# Patient Record
Sex: FEMALE | Race: WHITE | URBAN - METROPOLITAN AREA
[De-identification: names, ages, dates, MRNs, and addresses within clinical notes are randomized per-mention and may not be internally consistent; named-entity substitution may affect disease eponyms.]

---

## 2018-04-12 ENCOUNTER — OFFICE VISIT (OUTPATIENT)
Dept: ORTHOPEDIC SURGERY | Age: 48
End: 2018-04-12

## 2018-04-12 ENCOUNTER — HOSPITAL ENCOUNTER (OUTPATIENT)
Dept: OTHER | Age: 48
Discharge: HOME | End: 2018-04-12
Attending: ORTHOPAEDIC SURGERY | Admitting: ORTHOPAEDIC SURGERY

## 2018-04-12 VITALS
SYSTOLIC BLOOD PRESSURE: 141 MMHG | HEART RATE: 86 BPM | WEIGHT: 138.01 LBS | HEIGHT: 62 IN | DIASTOLIC BLOOD PRESSURE: 87 MMHG | BODY MASS INDEX: 25.4 KG/M2

## 2018-04-12 DIAGNOSIS — M25.532 LEFT WRIST PAIN: Primary | ICD-10-CM

## 2018-04-12 DIAGNOSIS — S63.502A SPRAIN OF LEFT WRIST, INITIAL ENCOUNTER: ICD-10-CM

## 2018-04-12 DIAGNOSIS — M25.532 LEFT WRIST PAIN: ICD-10-CM

## 2018-04-12 LAB
A/G RATIO: 1.2 (ref 1.1–2.2)
ALBUMIN SERPL-MCNC: 4.2 G/DL (ref 3.4–5)
ALP BLD-CCNC: 110 U/L (ref 40–129)
ALT SERPL-CCNC: 14 U/L (ref 10–40)
ANION GAP SERPL CALCULATED.3IONS-SCNC: 19 MMOL/L (ref 3–16)
AST SERPL-CCNC: 15 U/L (ref 15–37)
BASOPHILS ABSOLUTE: 0 K/UL (ref 0–0.2)
BASOPHILS RELATIVE PERCENT: 0.3 %
BILIRUB SERPL-MCNC: <0.2 MG/DL (ref 0–1)
BUN BLDV-MCNC: 8 MG/DL (ref 7–20)
C-REACTIVE PROTEIN: 17.6 MG/L (ref 0–5.1)
CALCIUM SERPL-MCNC: 8.6 MG/DL (ref 8.3–10.6)
CHLORIDE BLD-SCNC: 102 MMOL/L (ref 99–110)
CO2: 20 MMOL/L (ref 21–32)
CREAT SERPL-MCNC: 0.6 MG/DL (ref 0.6–1.1)
EOSINOPHILS ABSOLUTE: 0 K/UL (ref 0–0.6)
EOSINOPHILS RELATIVE PERCENT: 0.7 %
GFR AFRICAN AMERICAN: >60
GFR NON-AFRICAN AMERICAN: >60
GLOBULIN: 3.4 G/DL
GLUCOSE BLD-MCNC: 72 MG/DL (ref 70–99)
HCT VFR BLD CALC: 38 % (ref 36–48)
HEMOGLOBIN: 12.8 G/DL (ref 12–16)
LYMPHOCYTES ABSOLUTE: 1.1 K/UL (ref 1–5.1)
LYMPHOCYTES RELATIVE PERCENT: 18.6 %
MCH RBC QN AUTO: 32.1 PG (ref 26–34)
MCHC RBC AUTO-ENTMCNC: 33.7 G/DL (ref 31–36)
MCV RBC AUTO: 95.2 FL (ref 80–100)
MONOCYTES ABSOLUTE: 0.4 K/UL (ref 0–1.3)
MONOCYTES RELATIVE PERCENT: 6.3 %
NEUTROPHILS ABSOLUTE: 4.5 K/UL (ref 1.7–7.7)
NEUTROPHILS RELATIVE PERCENT: 74.1 %
PDW BLD-RTO: 13.3 % (ref 12.4–15.4)
PLATELET # BLD: 233 K/UL (ref 135–450)
PMV BLD AUTO: 9.6 FL (ref 5–10.5)
POTASSIUM SERPL-SCNC: 3.8 MMOL/L (ref 3.5–5.1)
RBC # BLD: 3.99 M/UL (ref 4–5.2)
RHEUMATOID FACTOR: 42 IU/ML
SEDIMENTATION RATE, ERYTHROCYTE: 26 MM/HR (ref 0–20)
SODIUM BLD-SCNC: 141 MMOL/L (ref 136–145)
TOTAL PROTEIN: 7.6 G/DL (ref 6.4–8.2)
URIC ACID, SERUM: 5.9 MG/DL (ref 2.6–6)
WBC # BLD: 6 K/UL (ref 4–11)

## 2018-04-12 PROCEDURE — 99203 OFFICE O/P NEW LOW 30 MIN: CPT | Performed by: ORTHOPAEDIC SURGERY

## 2018-04-12 PROCEDURE — L3908 WHO COCK-UP NONMOLDE PRE OTS: HCPCS | Performed by: ORTHOPAEDIC SURGERY

## 2018-04-13 LAB
ANA INTERPRETATION: NORMAL
ANTI-NUCLEAR ANTIBODY (ANA): NEGATIVE

## 2018-04-14 LAB — CCP IGG ANTIBODIES: 102 UNITS (ref 0–19)

## 2018-04-26 ENCOUNTER — OFFICE VISIT (OUTPATIENT)
Dept: ORTHOPEDIC SURGERY | Age: 48
End: 2018-04-26

## 2018-04-26 VITALS — BODY MASS INDEX: 25.4 KG/M2 | HEIGHT: 62 IN | WEIGHT: 138.01 LBS

## 2018-04-26 DIAGNOSIS — M79.642 PAIN OF LEFT HAND: ICD-10-CM

## 2018-04-26 DIAGNOSIS — S63.502D SPRAIN OF LEFT WRIST, SUBSEQUENT ENCOUNTER: Primary | ICD-10-CM

## 2018-04-26 PROCEDURE — 99214 OFFICE O/P EST MOD 30 MIN: CPT | Performed by: ORTHOPAEDIC SURGERY

## 2018-04-26 RX ORDER — METHYLPREDNISOLONE 4 MG/1
TABLET ORAL
Qty: 1 KIT | Refills: 0 | Status: SHIPPED | OUTPATIENT
Start: 2018-04-26 | End: 2018-06-28

## 2018-04-27 ENCOUNTER — OFFICE VISIT (OUTPATIENT)
Dept: RHEUMATOLOGY | Age: 48
End: 2018-04-27

## 2018-04-27 VITALS
DIASTOLIC BLOOD PRESSURE: 86 MMHG | HEART RATE: 84 BPM | BODY MASS INDEX: 25.4 KG/M2 | TEMPERATURE: 98 F | SYSTOLIC BLOOD PRESSURE: 138 MMHG | HEIGHT: 62 IN | WEIGHT: 138 LBS

## 2018-04-27 DIAGNOSIS — Z79.899 HIGH RISK MEDICATION USE: ICD-10-CM

## 2018-04-27 DIAGNOSIS — M05.79 RHEUMATOID ARTHRITIS INVOLVING MULTIPLE SITES WITH POSITIVE RHEUMATOID FACTOR (HCC): Primary | ICD-10-CM

## 2018-04-27 PROCEDURE — 99245 OFF/OP CONSLTJ NEW/EST HI 55: CPT | Performed by: INTERNAL MEDICINE

## 2018-04-27 RX ORDER — PREDNISONE 1 MG/1
TABLET ORAL
Qty: 30 TABLET | Refills: 0 | Status: SHIPPED | OUTPATIENT
Start: 2018-04-27 | End: 2020-01-15 | Stop reason: CLARIF

## 2018-04-27 RX ORDER — FOLIC ACID 1 MG/1
1 TABLET ORAL DAILY
Qty: 90 TABLET | Refills: 1 | Status: SHIPPED | OUTPATIENT
Start: 2018-04-27 | End: 2021-05-24

## 2018-04-28 LAB
HBV SURFACE AB TITR SER: 121.7 MIU/ML
HEPATITIS B CORE IGM ANTIBODY: NORMAL
HEPATITIS B SURFACE ANTIGEN INTERPRETATION: NORMAL
HEPATITIS C ANTIBODY INTERPRETATION: NORMAL

## 2018-05-02 ENCOUNTER — TELEPHONE (OUTPATIENT)
Dept: ORTHOPEDIC SURGERY | Age: 48
End: 2018-05-02

## 2018-05-08 ENCOUNTER — TELEPHONE (OUTPATIENT)
Dept: RHEUMATOLOGY | Age: 48
End: 2018-05-08

## 2018-05-09 RX ORDER — PREDNISONE 10 MG/1
TABLET ORAL
Qty: 30 TABLET | Refills: 1 | Status: SHIPPED | OUTPATIENT
Start: 2018-05-09 | End: 2018-06-28

## 2018-05-11 ENCOUNTER — TELEPHONE (OUTPATIENT)
Dept: ORTHOPEDIC SURGERY | Age: 48
End: 2018-05-11

## 2018-05-17 DIAGNOSIS — Z79.899 HIGH RISK MEDICATION USE: ICD-10-CM

## 2018-05-17 LAB
ALBUMIN SERPL-MCNC: 4.2 G/DL (ref 3.4–5)
ALP BLD-CCNC: 97 U/L (ref 40–129)
ALT SERPL-CCNC: 23 U/L (ref 10–40)
AST SERPL-CCNC: 15 U/L (ref 15–37)
BASOPHILS ABSOLUTE: 0 K/UL (ref 0–0.2)
BASOPHILS RELATIVE PERCENT: 0.5 %
BILIRUB SERPL-MCNC: <0.2 MG/DL (ref 0–1)
BILIRUBIN DIRECT: <0.2 MG/DL (ref 0–0.3)
BILIRUBIN, INDIRECT: NORMAL MG/DL (ref 0–1)
C-REACTIVE PROTEIN: 13.8 MG/L (ref 0–5.1)
CREAT SERPL-MCNC: 0.7 MG/DL (ref 0.6–1.1)
EOSINOPHILS ABSOLUTE: 0.1 K/UL (ref 0–0.6)
EOSINOPHILS RELATIVE PERCENT: 2 %
GFR AFRICAN AMERICAN: >60
GFR NON-AFRICAN AMERICAN: >60
HCT VFR BLD CALC: 37.9 % (ref 36–48)
HEMOGLOBIN: 12.9 G/DL (ref 12–16)
LYMPHOCYTES ABSOLUTE: 1 K/UL (ref 1–5.1)
LYMPHOCYTES RELATIVE PERCENT: 25.3 %
MCH RBC QN AUTO: 32.9 PG (ref 26–34)
MCHC RBC AUTO-ENTMCNC: 34.1 G/DL (ref 31–36)
MCV RBC AUTO: 96.6 FL (ref 80–100)
MONOCYTES ABSOLUTE: 0.3 K/UL (ref 0–1.3)
MONOCYTES RELATIVE PERCENT: 8.3 %
NEUTROPHILS ABSOLUTE: 2.6 K/UL (ref 1.7–7.7)
NEUTROPHILS RELATIVE PERCENT: 63.9 %
PDW BLD-RTO: 14.1 % (ref 12.4–15.4)
PLATELET # BLD: 266 K/UL (ref 135–450)
PMV BLD AUTO: 9.5 FL (ref 5–10.5)
RBC # BLD: 3.92 M/UL (ref 4–5.2)
SEDIMENTATION RATE, ERYTHROCYTE: 18 MM/HR (ref 0–20)
TOTAL PROTEIN: 7.3 G/DL (ref 6.4–8.2)
WBC # BLD: 4 K/UL (ref 4–11)

## 2018-06-14 ENCOUNTER — TELEPHONE (OUTPATIENT)
Dept: ORTHOPEDIC SURGERY | Age: 48
End: 2018-06-14

## 2018-06-28 ENCOUNTER — OFFICE VISIT (OUTPATIENT)
Dept: RHEUMATOLOGY | Age: 48
End: 2018-06-28

## 2018-06-28 VITALS
DIASTOLIC BLOOD PRESSURE: 82 MMHG | WEIGHT: 135.8 LBS | SYSTOLIC BLOOD PRESSURE: 145 MMHG | HEIGHT: 62 IN | BODY MASS INDEX: 24.99 KG/M2 | TEMPERATURE: 97.7 F

## 2018-06-28 DIAGNOSIS — M05.79 RHEUMATOID ARTHRITIS INVOLVING MULTIPLE SITES WITH POSITIVE RHEUMATOID FACTOR (HCC): Primary | ICD-10-CM

## 2018-06-28 DIAGNOSIS — Z79.899 HIGH RISK MEDICATION USE: ICD-10-CM

## 2018-06-28 LAB
ALBUMIN SERPL-MCNC: 4.2 G/DL (ref 3.4–5)
ALP BLD-CCNC: 92 U/L (ref 40–129)
ALT SERPL-CCNC: 27 U/L (ref 10–40)
AST SERPL-CCNC: 15 U/L (ref 15–37)
BASOPHILS ABSOLUTE: 0 K/UL (ref 0–0.2)
BASOPHILS RELATIVE PERCENT: 0.3 %
BILIRUB SERPL-MCNC: <0.2 MG/DL (ref 0–1)
BILIRUBIN DIRECT: <0.2 MG/DL (ref 0–0.3)
BILIRUBIN, INDIRECT: NORMAL MG/DL (ref 0–1)
C-REACTIVE PROTEIN: 6.8 MG/L (ref 0–5.1)
CREAT SERPL-MCNC: 0.9 MG/DL (ref 0.6–1.1)
EOSINOPHILS ABSOLUTE: 0 K/UL (ref 0–0.6)
EOSINOPHILS RELATIVE PERCENT: 0.3 %
GFR AFRICAN AMERICAN: >60
GFR NON-AFRICAN AMERICAN: >60
HCT VFR BLD CALC: 39.5 % (ref 36–48)
HEMOGLOBIN: 13.6 G/DL (ref 12–16)
LYMPHOCYTES ABSOLUTE: 1 K/UL (ref 1–5.1)
LYMPHOCYTES RELATIVE PERCENT: 11.7 %
MCH RBC QN AUTO: 33.4 PG (ref 26–34)
MCHC RBC AUTO-ENTMCNC: 34.4 G/DL (ref 31–36)
MCV RBC AUTO: 97 FL (ref 80–100)
MONOCYTES ABSOLUTE: 0.6 K/UL (ref 0–1.3)
MONOCYTES RELATIVE PERCENT: 6.8 %
NEUTROPHILS ABSOLUTE: 6.9 K/UL (ref 1.7–7.7)
NEUTROPHILS RELATIVE PERCENT: 80.9 %
PDW BLD-RTO: 14.7 % (ref 12.4–15.4)
PLATELET # BLD: 302 K/UL (ref 135–450)
PMV BLD AUTO: 9.1 FL (ref 5–10.5)
RBC # BLD: 4.07 M/UL (ref 4–5.2)
SEDIMENTATION RATE, ERYTHROCYTE: 14 MM/HR (ref 0–30)
TOTAL PROTEIN: 7.2 G/DL (ref 6.4–8.2)
WBC # BLD: 8.5 K/UL (ref 4–11)

## 2018-06-28 PROCEDURE — 99214 OFFICE O/P EST MOD 30 MIN: CPT | Performed by: INTERNAL MEDICINE

## 2018-07-13 NOTE — TELEPHONE ENCOUNTER
[facility] was done on 6.14.18 and faxed to [address] [place] [address],[facility] - missed closing out.

## 2018-08-28 ENCOUNTER — TELEPHONE (OUTPATIENT)
Dept: RHEUMATOLOGY | Age: 48
End: 2018-08-28

## 2018-10-08 ENCOUNTER — OFFICE VISIT (OUTPATIENT)
Dept: RHEUMATOLOGY | Age: 48
End: 2018-10-08
Payer: COMMERCIAL

## 2018-10-08 VITALS
DIASTOLIC BLOOD PRESSURE: 88 MMHG | WEIGHT: 141 LBS | BODY MASS INDEX: 25.95 KG/M2 | TEMPERATURE: 98.7 F | SYSTOLIC BLOOD PRESSURE: 138 MMHG | HEART RATE: 86 BPM | HEIGHT: 62 IN

## 2018-10-08 DIAGNOSIS — M05.79 RHEUMATOID ARTHRITIS INVOLVING MULTIPLE SITES WITH POSITIVE RHEUMATOID FACTOR (HCC): Primary | ICD-10-CM

## 2018-10-08 DIAGNOSIS — Z79.899 HIGH RISK MEDICATION USE: ICD-10-CM

## 2018-10-08 LAB
ALBUMIN SERPL-MCNC: 3.8 G/DL (ref 3.4–5)
ALP BLD-CCNC: 110 U/L (ref 40–129)
ALT SERPL-CCNC: 12 U/L (ref 10–40)
AST SERPL-CCNC: 13 U/L (ref 15–37)
BASOPHILS ABSOLUTE: 0 K/UL (ref 0–0.2)
BASOPHILS RELATIVE PERCENT: 0.4 %
BILIRUB SERPL-MCNC: <0.2 MG/DL (ref 0–1)
BILIRUBIN DIRECT: <0.2 MG/DL (ref 0–0.3)
BILIRUBIN, INDIRECT: ABNORMAL MG/DL (ref 0–1)
C-REACTIVE PROTEIN: 18.5 MG/L (ref 0–5.1)
CREAT SERPL-MCNC: 0.8 MG/DL (ref 0.6–1.1)
EOSINOPHILS ABSOLUTE: 0.1 K/UL (ref 0–0.6)
EOSINOPHILS RELATIVE PERCENT: 1.3 %
GFR AFRICAN AMERICAN: >60
GFR NON-AFRICAN AMERICAN: >60
HCT VFR BLD CALC: 40.5 % (ref 36–48)
HEMOGLOBIN: 13.6 G/DL (ref 12–16)
LYMPHOCYTES ABSOLUTE: 1.1 K/UL (ref 1–5.1)
LYMPHOCYTES RELATIVE PERCENT: 19.4 %
MCH RBC QN AUTO: 33.3 PG (ref 26–34)
MCHC RBC AUTO-ENTMCNC: 33.6 G/DL (ref 31–36)
MCV RBC AUTO: 99.1 FL (ref 80–100)
MONOCYTES ABSOLUTE: 0.4 K/UL (ref 0–1.3)
MONOCYTES RELATIVE PERCENT: 6.3 %
NEUTROPHILS ABSOLUTE: 4.2 K/UL (ref 1.7–7.7)
NEUTROPHILS RELATIVE PERCENT: 72.6 %
PDW BLD-RTO: 13.4 % (ref 12.4–15.4)
PLATELET # BLD: 263 K/UL (ref 135–450)
PMV BLD AUTO: 9.3 FL (ref 5–10.5)
RBC # BLD: 4.08 M/UL (ref 4–5.2)
SEDIMENTATION RATE, ERYTHROCYTE: 15 MM/HR (ref 0–30)
TOTAL PROTEIN: 7.3 G/DL (ref 6.4–8.2)
WBC # BLD: 5.8 K/UL (ref 4–11)

## 2018-10-08 PROCEDURE — 99214 OFFICE O/P EST MOD 30 MIN: CPT | Performed by: INTERNAL MEDICINE

## 2018-10-08 NOTE — PROGRESS NOTES
deformities noted in digits or nails. DATA:   Lab Results   Component Value Date    WBC 8.5 06/28/2018    HGB 13.6 06/28/2018    HCT 39.5 06/28/2018    MCV 97.0 06/28/2018     06/28/2018         Chemistry        Component Value Date/Time     04/12/2018 1132    K 3.8 04/12/2018 1132     04/12/2018 1132    CO2 20 (L) 04/12/2018 1132    BUN 8 04/12/2018 1132    CREATININE 0.9 06/28/2018 1143        Component Value Date/Time    CALCIUM 8.6 04/12/2018 1132    ALKPHOS 92 06/28/2018 1143    AST 15 06/28/2018 1143    ALT 27 06/28/2018 1143    BILITOT <0.2 06/28/2018 1143          Lab Results   Component Value Date    LABURIC 5.9 04/12/2018     Lab Results   Component Value Date    SEDRATE 14 06/28/2018     Lab Results   Component Value Date    CRP 6.8 (H) 06/28/2018          Radiology Review:   Left wrist X rays- joint space narrowing of carpal joints, erosions in carpal bones. A/P- See above.

## 2019-04-01 ENCOUNTER — OFFICE VISIT (OUTPATIENT)
Dept: RHEUMATOLOGY | Age: 49
End: 2019-04-01
Payer: COMMERCIAL

## 2019-04-01 VITALS
BODY MASS INDEX: 27.42 KG/M2 | HEART RATE: 80 BPM | SYSTOLIC BLOOD PRESSURE: 134 MMHG | DIASTOLIC BLOOD PRESSURE: 82 MMHG | WEIGHT: 149 LBS | TEMPERATURE: 98.1 F | HEIGHT: 62 IN

## 2019-04-01 DIAGNOSIS — Z79.899 HIGH RISK MEDICATION USE: ICD-10-CM

## 2019-04-01 DIAGNOSIS — M05.79 RHEUMATOID ARTHRITIS INVOLVING MULTIPLE SITES WITH POSITIVE RHEUMATOID FACTOR (HCC): Primary | ICD-10-CM

## 2019-04-01 LAB
ALBUMIN SERPL-MCNC: 3.8 G/DL (ref 3.4–5)
ALP BLD-CCNC: 90 U/L (ref 40–129)
ALT SERPL-CCNC: 15 U/L (ref 10–40)
AST SERPL-CCNC: 12 U/L (ref 15–37)
BASOPHILS ABSOLUTE: 0 K/UL (ref 0–0.2)
BASOPHILS RELATIVE PERCENT: 0.3 %
BILIRUB SERPL-MCNC: <0.2 MG/DL (ref 0–1)
BILIRUBIN DIRECT: <0.2 MG/DL (ref 0–0.3)
BILIRUBIN, INDIRECT: ABNORMAL MG/DL (ref 0–1)
C-REACTIVE PROTEIN: 18 MG/L (ref 0–5.1)
CREAT SERPL-MCNC: 0.8 MG/DL (ref 0.6–1.1)
EOSINOPHILS ABSOLUTE: 0 K/UL (ref 0–0.6)
EOSINOPHILS RELATIVE PERCENT: 0.6 %
GFR AFRICAN AMERICAN: >60
GFR NON-AFRICAN AMERICAN: >60
HCT VFR BLD CALC: 38.4 % (ref 36–48)
HEMOGLOBIN: 12.7 G/DL (ref 12–16)
LYMPHOCYTES ABSOLUTE: 1.9 K/UL (ref 1–5.1)
LYMPHOCYTES RELATIVE PERCENT: 25.8 %
MCH RBC QN AUTO: 33 PG (ref 26–34)
MCHC RBC AUTO-ENTMCNC: 33 G/DL (ref 31–36)
MCV RBC AUTO: 100 FL (ref 80–100)
MONOCYTES ABSOLUTE: 0.5 K/UL (ref 0–1.3)
MONOCYTES RELATIVE PERCENT: 7 %
NEUTROPHILS ABSOLUTE: 5 K/UL (ref 1.7–7.7)
NEUTROPHILS RELATIVE PERCENT: 66.3 %
PDW BLD-RTO: 13.9 % (ref 12.4–15.4)
PLATELET # BLD: 277 K/UL (ref 135–450)
PMV BLD AUTO: 9.2 FL (ref 5–10.5)
RBC # BLD: 3.84 M/UL (ref 4–5.2)
SEDIMENTATION RATE, ERYTHROCYTE: 18 MM/HR (ref 0–30)
TOTAL PROTEIN: 7.4 G/DL (ref 6.4–8.2)
WBC # BLD: 7.5 K/UL (ref 4–11)

## 2019-04-01 PROCEDURE — 99214 OFFICE O/P EST MOD 30 MIN: CPT | Performed by: INTERNAL MEDICINE

## 2019-04-01 RX ORDER — PREDNISONE 10 MG/1
TABLET ORAL
Qty: 35 TABLET | Refills: 0 | Status: SHIPPED | OUTPATIENT
Start: 2019-04-01 | End: 2020-01-15 | Stop reason: CLARIF

## 2019-04-01 NOTE — PROGRESS NOTES
[address], [place]                                                           [address], [address] [facility]                                                              (U) [phone] (F)      Dear Dr. Augustine Barnes:  Please find Rheumatology assessment. Thank you for giving me the opportunity to be involved in [facility]'s care and I look forward following [facility] along with you. If you have any questions or concerns please feel free to reach me. Note is transcribed using voice recognition software. Inadvertent computerized transcription errors may be present. Patient identification: Hilda Nunez,: 1970,48 y.o. Sex: female     Assessment / Plan:  [facility] was seen today for follow-up. Diagnoses and all orders for this visit:    Rheumatoid arthritis involving multiple sites with positive rheumatoid factor (HCC)    High risk medication use  -     CBC Auto Differential; Future  -     Creatinine, Serum; Future  -     Hepatic Function Panel; Future  -     C-Reactive Protein; Future  -     Sedimentation Rate; Future    Other orders  -     methotrexate (RHEUMATREX) 2.5 MG chemo tablet; Take 8 tab po once a week. -     predniSONE (DELTASONE) 10 MG tablet; 2 tab po daily x 7 days. 1.5 tab po daily x 7 days. 1 tab po daily x 7 days. 1/2 tab po daily 7 days and stop. Seropositive erosive RA- symptoms approx 10 years, diagnosis- 2018. Lost insurance, off of Methotrexate since 10/2018, RA is active, desires to go back on med. Methotrexate 8 tablets once a week and folic acid had worked well in past.    Plan-  Resumed methotrexate, also gave her low-dose prednisone taper for current flare. Labs today and also in couple of months.   Follow-up in 3 months  Patient indicates understanding and agrees with the management plan. I reviewed patient's history, referral documents and electronic medical records. #######################################################################    [id number] for seropositive rheumatoid arthritis. She was doing well in terms of rheumatoid arthritis, lost her insurance, could not afford medications, and ran out of medication since October 2018. RA is active mainly in her finger joints and wrists associated with pain, stiffness, swelling, worse in the morning with 1-2 hours of stiffness. She desires to go back on her medications. Had worked well in past.    No intercurrent infections. Denies mucositis, hair loss, cough or shortness of breath, GI symptoms, rashes or weight loss. last month stable. all other review of systems are negative. Past Medical History:   Diagnosis Date    Depression      History reviewed. No pertinent surgical history. FH- P Aunt - RA. Current Outpatient Medications   Medication Sig Dispense Refill    methotrexate (RHEUMATREX) 2.5 MG chemo tablet Take 8 tab po once a week. 32 tablet 1    predniSONE (DELTASONE) 10 MG tablet 2 tab po daily x 7 days. 1.5 tab po daily x 7 days. 1 tab po daily x 7 days. 1/2 tab po daily 7 days and stop. 35 tablet 0    folic acid (FOLVITE) 1 MG tablet Take 1 tablet by mouth daily Take 1 tab po daily. 90 tablet 1    predniSONE (DELTASONE) 5 MG tablet Take 1 tab po daily 30 tablet 0    Zolpidem Tartrate (AMBIEN PO) Take by mouth      FLUoxetine (PROZAC) 10 MG capsule Take 10 mg by mouth daily       No current facility-administered medications for this visit.       No Known Allergies    PHYSICAL EXAM:    Vitals:    /82   Pulse 80   Temp 98.1 °F (36.7 °C) (Oral)   Ht 5' 2\" (1.575 m)   Wt 149 lb (67.6 kg)   BMI 27.25 kg/m²   General appearance/ Psychiatric: well nourished, and well groomed, normal judgement, alert, appears stated age and cooperative. MKS:   28 joint JOINT COUNT:                               Right                                                  Left   Swell Tender Swell Tender   PIP1           PIP2  x  x       PIP3  x  x       PIP4  x x       PIP5          MCP1           MCP2  x  x  x  x   MCP3 x  x  x  x   MCP4           MCP5           Wrist  x  x  x  x   Elbow           Shoulder          Knee             Full fist, loose, feels tight. Ankles and feet joints-normal.    Normal gait, muscle strength in upper and lower extremities bilaterally. Skin: No rashes, no induration or skin thickening or nodules. No evidence ischemia or deformities noted in digits or nails. DATA:   Lab Results   Component Value Date    WBC 5.8 10/08/2018    HGB 13.6 10/08/2018    HCT 40.5 10/08/2018    MCV 99.1 10/08/2018     10/08/2018         Chemistry        Component Value Date/Time     04/12/2018 1132    K 3.8 04/12/2018 1132     04/12/2018 1132    CO2 20 (L) 04/12/2018 1132    BUN 8 04/12/2018 1132    CREATININE 0.8 10/08/2018 1217        Component Value Date/Time    CALCIUM 8.6 04/12/2018 1132    ALKPHOS 110 10/08/2018 1217    AST 13 (L) 10/08/2018 1217    ALT 12 10/08/2018 1217    BILITOT <0.2 10/08/2018 1217          Lab Results   Component Value Date    LABURIC 5.9 04/12/2018     Lab Results   Component Value Date    SEDRATE 15 10/08/2018     Lab Results   Component Value Date    CRP 18.5 (H) 10/08/2018          Radiology Review:   Left wrist X rays- joint space narrowing of carpal joints, erosions in carpal bones. A/P- See above.

## 2019-06-07 ENCOUNTER — OFFICE VISIT (OUTPATIENT)
Dept: RHEUMATOLOGY | Age: 49
End: 2019-06-07
Payer: COMMERCIAL

## 2019-06-07 VITALS
WEIGHT: 148 LBS | BODY MASS INDEX: 27.23 KG/M2 | DIASTOLIC BLOOD PRESSURE: 82 MMHG | SYSTOLIC BLOOD PRESSURE: 120 MMHG | HEIGHT: 62 IN

## 2019-06-07 DIAGNOSIS — Z79.899 HIGH RISK MEDICATION USE: ICD-10-CM

## 2019-06-07 DIAGNOSIS — M05.79 RHEUMATOID ARTHRITIS INVOLVING MULTIPLE SITES WITH POSITIVE RHEUMATOID FACTOR (HCC): Primary | ICD-10-CM

## 2019-06-07 DIAGNOSIS — R76.11 HISTORY OF POSITIVE PPD, TREATMENT STATUS UNKNOWN: ICD-10-CM

## 2019-06-07 LAB
ALT SERPL-CCNC: 12 U/L (ref 10–40)
AST SERPL-CCNC: 13 U/L (ref 15–37)
BASOPHILS ABSOLUTE: 0 K/UL (ref 0–0.2)
BASOPHILS RELATIVE PERCENT: 0.3 %
C-REACTIVE PROTEIN: 13.8 MG/L (ref 0–5.1)
CREAT SERPL-MCNC: 0.7 MG/DL (ref 0.6–1.1)
EOSINOPHILS ABSOLUTE: 0.1 K/UL (ref 0–0.6)
EOSINOPHILS RELATIVE PERCENT: 1.2 %
GFR AFRICAN AMERICAN: >60
GFR NON-AFRICAN AMERICAN: >60
HCT VFR BLD CALC: 38.1 % (ref 36–48)
HEMOGLOBIN: 12.8 G/DL (ref 12–16)
LYMPHOCYTES ABSOLUTE: 1.8 K/UL (ref 1–5.1)
LYMPHOCYTES RELATIVE PERCENT: 27.1 %
MCH RBC QN AUTO: 33.7 PG (ref 26–34)
MCHC RBC AUTO-ENTMCNC: 33.6 G/DL (ref 31–36)
MCV RBC AUTO: 100.5 FL (ref 80–100)
MONOCYTES ABSOLUTE: 0.5 K/UL (ref 0–1.3)
MONOCYTES RELATIVE PERCENT: 8.3 %
NEUTROPHILS ABSOLUTE: 4.2 K/UL (ref 1.7–7.7)
NEUTROPHILS RELATIVE PERCENT: 63.1 %
PDW BLD-RTO: 14.1 % (ref 12.4–15.4)
PLATELET # BLD: 272 K/UL (ref 135–450)
PMV BLD AUTO: 9.6 FL (ref 5–10.5)
RBC # BLD: 3.79 M/UL (ref 4–5.2)
SEDIMENTATION RATE, ERYTHROCYTE: 14 MM/HR (ref 0–30)
WBC # BLD: 6.6 K/UL (ref 4–11)

## 2019-06-07 PROCEDURE — 99215 OFFICE O/P EST HI 40 MIN: CPT | Performed by: INTERNAL MEDICINE

## 2019-06-07 NOTE — PROGRESS NOTES
[address], [place]                                                           [address], [address] [facility]                                                              (N) [phone] (F)      Dear Dr. Preston Short:  Please find Rheumatology assessment. Thank you for giving me the opportunity to be involved in [facility]'s care and I look forward following [facility] along with you. If you have any questions or concerns please feel free to reach me. Note is transcribed using voice recognition software. Inadvertent computerized transcription errors may be present. Patient identification: Yelena Nunez,: 1970,48 y.o. Sex: female     Assessment / Plan:  [facility] was seen today for follow-up. Diagnoses and all orders for this visit:    Rheumatoid arthritis involving multiple sites with positive rheumatoid factor (HCC)    High risk medication use  -     AST(SGOT) & ALT(SGPT); Standing  -     CBC Auto Differential; Standing  -     Creatinine, Serum; Standing  -     C-Reactive Protein; Standing  -     Sedimentation Rate; Standing  -     Quantiferon, Incubated; Future  -     methotrexate (RHEUMATREX) 2.5 MG chemo tablet; Take 8 tab po once a [id number]    History of positive PPD, treatment status unknown    Other orders  -     Adalimumab (HUMIRA) 40 MG/0.4ML PSKT; Inject 40 mg sc every 14 days. Seropositive erosive RA- symptoms approx 10 years, diagnosis- 2018. Active rheumatoid arthritis on methotrexate 20 mg weekly and folic acid. History of positive PPD in past, took [address] [employer] for 6-9 months-approximately 8 years ago, no history of any active tuberculosis. Plan-  Recommend adding biologic agent to methotrexate.   Obtain TB QuantiFERON test and methotrexate labs today. If it is positive, would plan Enbrel, otherwise Humira should be reasonable. Reviewed anti-TNF medications- Side effects, directions, cost of therapy was explained including but not limited to disseminated atypical infections, tuberculosis/fungal infections, injection site reactions, demyelinating disorders, malignancy. Continue current dose of methotrexate. Follow-up in 3 months  Patient indicates understanding and agrees with the management plan. I reviewed patient's history, referral documents and electronic medical records. Time spent with patient is 40 minutes, > 20 minutes was spent face to face explaining various treatment options, monitoring, side effects, directions, therapy as above. #######################################################################    Plsvjfowfm-bpyjrg-ub for seropositive rheumatoid arthritis. Interval Cesilia Berkowitz continues to have pain, swelling, stiffness especially in her wrist and  MCP joints, methotrexate has helped, however some days are really bad days where it affects ADLs. Daily stiffness for 15-30 minutes. Tolerating methotrexate well. No intercurrent infections. Denies mucositis, hair loss, cough or shortness of breath, GI symptoms, rashes or weight loss. last month stable. all other review of systems are negative. Past Medical History:   Diagnosis Date    Depression      No past surgical history on file. FH- P Aunt - RA. Current Outpatient Medications   Medication Sig Dispense Refill    Adalimumab (HUMIRA) 40 MG/0.4ML PSKT Inject 40 mg sc every 14 days. 2 each 3    methotrexate (RHEUMATREX) 2.5 MG chemo tablet Take 8 tab po once a week32 32 tablet 2    predniSONE (DELTASONE) 10 MG tablet 2 tab po daily x 7 days. 1.5 tab po daily x 7 days. 1 tab po daily x 7 days. 1/2 tab po daily 7 days and stop.  35 tablet 0    predniSONE (DELTASONE) 5 MG tablet Take 1 tab po daily 30 tablet 0    Zolpidem Tartrate (AMBIEN PO) Take by mouth      FLUoxetine (PROZAC) 10 MG capsule Take 10 mg by mouth daily      folic acid (FOLVITE) 1 MG tablet Take 1 tablet by mouth daily Take 1 tab po daily. 90 tablet 1     No current facility-administered medications for this visit. No Known Allergies    PHYSICAL EXAM:    Vitals:    /82   Ht 5' 2.01\" (1.575 m)   Wt 148 lb (67.1 kg)   BMI 27.06 kg/m²   General appearance/ Psychiatric: well nourished, and well groomed, normal judgement, alert, appears stated age and cooperative. MKS:   28 joint JOINT COUNT:                               Right                                                  Left   Swell Tender Swell Tender   PIP1           PIP2           PIP3  x  x       PIP4  x x       PIP5          MCP1           MCP2    x       MCP3   x       MCP4           MCP5           Wrist  x  x  x  x   Elbow           Shoulder          Knee             Full fist, loose, feels tight. Ankles and feet joints-normal.    Normal gait, muscle strength in upper and lower extremities bilaterally. Skin: No rashes, no induration or skin thickening or nodules. No evidence ischemia or deformities noted in digits or nails.     DATA:   Lab Results   Component Value Date    WBC 7.5 04/01/2019    HGB 12.7 04/01/2019    HCT 38.4 04/01/2019    .0 04/01/2019     04/01/2019         Chemistry        Component Value Date/Time     04/12/2018 1132    K 3.8 04/12/2018 1132     04/12/2018 1132    CO2 20 (L) 04/12/2018 1132    BUN 8 04/12/2018 1132    CREATININE 0.8 04/01/2019 0939        Component Value Date/Time    CALCIUM 8.6 04/12/2018 1132    ALKPHOS 90 04/01/2019 0939    AST 12 (L) 04/01/2019 0939    ALT 15 04/01/2019 0939    BILITOT <0.2 04/01/2019 0939          Lab Results   Component Value Date    LABURIC 5.9 04/12/2018     Lab Results   Component Value Date    SEDRATE 18 04/01/2019     Lab Results   Component Value Date    CRP 18.0 (H) 04/01/2019          Radiology Review:   Left wrist X rays-

## 2019-06-10 LAB
QUANTI TB GOLD PLUS: NEGATIVE
QUANTI TB1 MINUS NIL: 0 IU/ML (ref 0–0.34)
QUANTI TB2 MINUS NIL: 0 IU/ML (ref 0–0.34)
QUANTIFERON MITOGEN: 9.89 IU/ML
QUANTIFERON NIL: 0.02 IU/ML

## 2019-07-22 ENCOUNTER — TELEPHONE (OUTPATIENT)
Dept: ORTHOPEDIC SURGERY | Age: 49
End: 2019-07-22

## 2019-10-15 ENCOUNTER — OFFICE VISIT (OUTPATIENT)
Dept: RHEUMATOLOGY | Age: 49
End: 2019-10-15
Payer: COMMERCIAL

## 2019-10-15 VITALS
BODY MASS INDEX: 27.6 KG/M2 | SYSTOLIC BLOOD PRESSURE: 120 MMHG | WEIGHT: 150 LBS | HEIGHT: 62 IN | DIASTOLIC BLOOD PRESSURE: 76 MMHG

## 2019-10-15 DIAGNOSIS — M05.79 RHEUMATOID ARTHRITIS INVOLVING MULTIPLE SITES WITH POSITIVE RHEUMATOID FACTOR (HCC): ICD-10-CM

## 2019-10-15 DIAGNOSIS — Z79.899 HIGH RISK MEDICATION USE: ICD-10-CM

## 2019-10-15 DIAGNOSIS — M05.79 RHEUMATOID ARTHRITIS INVOLVING MULTIPLE SITES WITH POSITIVE RHEUMATOID FACTOR (HCC): Primary | ICD-10-CM

## 2019-10-15 LAB
ALBUMIN SERPL-MCNC: 4.6 G/DL (ref 3.4–5)
ALP BLD-CCNC: 104 U/L (ref 40–129)
ALT SERPL-CCNC: 12 U/L (ref 10–40)
AST SERPL-CCNC: 14 U/L (ref 15–37)
BASOPHILS ABSOLUTE: 0 K/UL (ref 0–0.2)
BASOPHILS RELATIVE PERCENT: 0.3 %
BILIRUB SERPL-MCNC: <0.2 MG/DL (ref 0–1)
BILIRUBIN DIRECT: <0.2 MG/DL (ref 0–0.3)
BILIRUBIN, INDIRECT: ABNORMAL MG/DL (ref 0–1)
C-REACTIVE PROTEIN: 22.9 MG/L (ref 0–5.1)
CREAT SERPL-MCNC: 0.8 MG/DL (ref 0.6–1.1)
EOSINOPHILS ABSOLUTE: 0 K/UL (ref 0–0.6)
EOSINOPHILS RELATIVE PERCENT: 0.2 %
GFR AFRICAN AMERICAN: >60
GFR NON-AFRICAN AMERICAN: >60
HCT VFR BLD CALC: 39.1 % (ref 36–48)
HEMOGLOBIN: 12.8 G/DL (ref 12–16)
LYMPHOCYTES ABSOLUTE: 1.1 K/UL (ref 1–5.1)
LYMPHOCYTES RELATIVE PERCENT: 9 %
MCH RBC QN AUTO: 32.2 PG (ref 26–34)
MCHC RBC AUTO-ENTMCNC: 32.8 G/DL (ref 31–36)
MCV RBC AUTO: 98.2 FL (ref 80–100)
MONOCYTES ABSOLUTE: 0.6 K/UL (ref 0–1.3)
MONOCYTES RELATIVE PERCENT: 4.7 %
NEUTROPHILS ABSOLUTE: 10.7 K/UL (ref 1.7–7.7)
NEUTROPHILS RELATIVE PERCENT: 85.8 %
PDW BLD-RTO: 14.6 % (ref 12.4–15.4)
PLATELET # BLD: 287 K/UL (ref 135–450)
PMV BLD AUTO: 9.4 FL (ref 5–10.5)
RBC # BLD: 3.98 M/UL (ref 4–5.2)
SEDIMENTATION RATE, ERYTHROCYTE: 17 MM/HR (ref 0–30)
TOTAL PROTEIN: 7.7 G/DL (ref 6.4–8.2)
WBC # BLD: 12.4 K/UL (ref 4–11)

## 2019-10-15 PROCEDURE — 99214 OFFICE O/P EST MOD 30 MIN: CPT | Performed by: INTERNAL MEDICINE

## 2020-01-15 ENCOUNTER — OFFICE VISIT (OUTPATIENT)
Dept: RHEUMATOLOGY | Age: 50
End: 2020-01-15
Payer: COMMERCIAL

## 2020-01-15 VITALS
WEIGHT: 155 LBS | HEIGHT: 62 IN | BODY MASS INDEX: 28.52 KG/M2 | SYSTOLIC BLOOD PRESSURE: 118 MMHG | DIASTOLIC BLOOD PRESSURE: 80 MMHG

## 2020-01-15 DIAGNOSIS — Z79.899 HIGH RISK MEDICATION USE: ICD-10-CM

## 2020-01-15 LAB
BASOPHILS ABSOLUTE: 0 K/UL (ref 0–0.2)
BASOPHILS RELATIVE PERCENT: 0.1 %
C-REACTIVE PROTEIN: 5.9 MG/L (ref 0–5.1)
CREAT SERPL-MCNC: 0.8 MG/DL (ref 0.6–1.1)
EOSINOPHILS ABSOLUTE: 0 K/UL (ref 0–0.6)
EOSINOPHILS RELATIVE PERCENT: 0.1 %
GFR AFRICAN AMERICAN: >60
GFR NON-AFRICAN AMERICAN: >60
HCT VFR BLD CALC: 38.3 % (ref 36–48)
HEMOGLOBIN: 12.9 G/DL (ref 12–16)
LYMPHOCYTES ABSOLUTE: 1 K/UL (ref 1–5.1)
LYMPHOCYTES RELATIVE PERCENT: 8.6 %
MCH RBC QN AUTO: 33.9 PG (ref 26–34)
MCHC RBC AUTO-ENTMCNC: 33.6 G/DL (ref 31–36)
MCV RBC AUTO: 100.9 FL (ref 80–100)
MONOCYTES ABSOLUTE: 0.3 K/UL (ref 0–1.3)
MONOCYTES RELATIVE PERCENT: 3 %
NEUTROPHILS ABSOLUTE: 9.8 K/UL (ref 1.7–7.7)
NEUTROPHILS RELATIVE PERCENT: 88.2 %
PDW BLD-RTO: 14.8 % (ref 12.4–15.4)
PLATELET # BLD: 255 K/UL (ref 135–450)
PMV BLD AUTO: 9.3 FL (ref 5–10.5)
RBC # BLD: 3.8 M/UL (ref 4–5.2)
SEDIMENTATION RATE, ERYTHROCYTE: 12 MM/HR (ref 0–30)
WBC # BLD: 11.1 K/UL (ref 4–11)

## 2020-01-15 PROCEDURE — 99214 OFFICE O/P EST MOD 30 MIN: CPT | Performed by: INTERNAL MEDICINE

## 2020-01-15 NOTE — PROGRESS NOTES
[address] [facility], [place]                                                                                                                         [phone] (u) [phone] (F)      Dear Dr. Keyshawn Tinajero:  Please find Rheumatology assessment. Thank you for giving me the opportunity to be involved in Amadeo Nunez's care and I look forward following Amadeo Rocha along with you. If you have any questions or concerns please feel free to reach me. Note is transcribed using voice recognition software. Inadvertent computerized transcription errors may be present. Patient identification: Vanessa Nunez,: 1970,49 y.o. Sex: female     Assessment / Plan:  Amadeo Rocha was seen today for follow-up. Diagnoses and all orders for this visit:    Rheumatoid arthritis involving multiple sites with positive rheumatoid factor (HCC)  -     Adalimumab (HUMIRA) 40 MG/0.4ML PSKT; Inject 40 mg sc every 14 days. High risk medication use  -     Creatinine, Serum; Future  -     C-Reactive Protein; Future  -     Sedimentation Rate; Future  -     CBC Auto Differential; Future      Seropositive erosive RA- symptoms approx 10 years, diagnosis- 2018. RA is in remission on Humira, started 10/20/2019. No symptoms of flares. Off of methotrexate. History of positive PPD in past, took [address] [employer] for 6-9 months-approximately 8 years ago, no history of any active tuberculosis. TB QuantiFERON test -negative from-2019    Plan-  Labs to check medication and disease activity. PA Humira with a new insurance, continue current medications. Follow-up in 4 months. Patient indicates understanding and agrees with the management plan.   I reviewed patient's history, referral documents and electronic medical rashes, no induration or skin thickening or nodules. No evidence ischemia or deformities noted in digits or nails. DATA:   Lab Results   Component Value Date    WBC 12.4 (H) 10/15/2019    HGB 12.8 10/15/2019    HCT 39.1 10/15/2019    MCV 98.2 10/15/2019     10/15/2019         Chemistry        Component Value Date/Time     04/12/2018 1132    K 3.8 04/12/2018 1132     04/12/2018 1132    CO2 20 (L) 04/12/2018 1132    BUN 8 04/12/2018 1132    CREATININE 0.8 10/15/2019 1251        Component Value Date/Time    CALCIUM 8.6 04/12/2018 1132    ALKPHOS 104 10/15/2019 1251    AST 14 (L) 10/15/2019 1251    ALT 12 10/15/2019 1251    BILITOT <0.2 10/15/2019 1251          Lab Results   Component Value Date    LABURIC 5.9 04/12/2018     Lab Results   Component Value Date    SEDRATE 17 10/15/2019     Lab Results   Component Value Date    CRP 22.9 (H) 10/15/2019          Radiology Review:   Left wrist X rays- joint space narrowing of carpal joints, erosions in carpal bones. A/P- See above.

## 2021-05-24 ENCOUNTER — HOSPITAL ENCOUNTER (OUTPATIENT)
Dept: GENERAL RADIOLOGY | Age: 51
Discharge: HOME | End: 2021-05-24
Payer: COMMERCIAL

## 2021-05-24 ENCOUNTER — OFFICE VISIT (OUTPATIENT)
Dept: RHEUMATOLOGY | Age: 51
End: 2021-05-24
Payer: COMMERCIAL

## 2021-05-24 ENCOUNTER — HOSPITAL ENCOUNTER (OUTPATIENT)
Age: 51
Discharge: HOME | End: 2021-05-24
Payer: COMMERCIAL

## 2021-05-24 VITALS
DIASTOLIC BLOOD PRESSURE: 84 MMHG | WEIGHT: 153 LBS | HEIGHT: 62 IN | SYSTOLIC BLOOD PRESSURE: 132 MMHG | BODY MASS INDEX: 28.16 KG/M2

## 2021-05-24 DIAGNOSIS — Z79.899 HIGH RISK MEDICATION USE: ICD-10-CM

## 2021-05-24 DIAGNOSIS — M05.79 RHEUMATOID ARTHRITIS INVOLVING MULTIPLE SITES WITH POSITIVE RHEUMATOID FACTOR (HCC): Primary | ICD-10-CM

## 2021-05-24 DIAGNOSIS — M05.79 RHEUMATOID ARTHRITIS INVOLVING MULTIPLE SITES WITH POSITIVE RHEUMATOID FACTOR (HCC): ICD-10-CM

## 2021-05-24 LAB
A/G RATIO: 1.2 (ref 1.1–2.2)
ALBUMIN SERPL-MCNC: 4.1 G/DL (ref 3.4–5)
ALP BLD-CCNC: 140 U/L (ref 40–129)
ALT SERPL-CCNC: 16 U/L (ref 10–40)
ANION GAP SERPL CALCULATED.3IONS-SCNC: 16 MMOL/L (ref 3–16)
AST SERPL-CCNC: 16 U/L (ref 15–37)
BASOPHILS ABSOLUTE: 0 K/UL (ref 0–0.2)
BASOPHILS RELATIVE PERCENT: 0.2 %
BILIRUB SERPL-MCNC: <0.2 MG/DL (ref 0–1)
BUN BLDV-MCNC: 9 MG/DL (ref 7–20)
C-REACTIVE PROTEIN: 39.5 MG/L (ref 0–5.1)
CALCIUM SERPL-MCNC: 8.8 MG/DL (ref 8.3–10.6)
CHLORIDE BLD-SCNC: 106 MMOL/L (ref 99–110)
CO2: 20 MMOL/L (ref 21–32)
CREAT SERPL-MCNC: 0.7 MG/DL (ref 0.6–1.1)
EOSINOPHILS ABSOLUTE: 0 K/UL (ref 0–0.6)
EOSINOPHILS RELATIVE PERCENT: 0.2 %
GFR AFRICAN AMERICAN: >60
GFR NON-AFRICAN AMERICAN: >60
GLOBULIN: 3.5 G/DL
GLUCOSE BLD-MCNC: 86 MG/DL (ref 70–99)
HCT VFR BLD CALC: 34 % (ref 36–48)
HEMOGLOBIN: 11.4 G/DL (ref 12–16)
LYMPHOCYTES ABSOLUTE: 1 K/UL (ref 1–5.1)
LYMPHOCYTES RELATIVE PERCENT: 9.1 %
MCH RBC QN AUTO: 31.5 PG (ref 26–34)
MCHC RBC AUTO-ENTMCNC: 33.6 G/DL (ref 31–36)
MCV RBC AUTO: 93.9 FL (ref 80–100)
MONOCYTES ABSOLUTE: 0.3 K/UL (ref 0–1.3)
MONOCYTES RELATIVE PERCENT: 3 %
NEUTROPHILS ABSOLUTE: 9.3 K/UL (ref 1.7–7.7)
NEUTROPHILS RELATIVE PERCENT: 87.5 %
PDW BLD-RTO: 14.2 % (ref 12.4–15.4)
PLATELET # BLD: 348 K/UL (ref 135–450)
PMV BLD AUTO: 8.5 FL (ref 5–10.5)
POTASSIUM SERPL-SCNC: 4.3 MMOL/L (ref 3.5–5.1)
RBC # BLD: 3.62 M/UL (ref 4–5.2)
SEDIMENTATION RATE, ERYTHROCYTE: 46 MM/HR (ref 0–30)
SODIUM BLD-SCNC: 142 MMOL/L (ref 136–145)
TOTAL PROTEIN: 7.6 G/DL (ref 6.4–8.2)
WBC # BLD: 10.6 K/UL (ref 4–11)

## 2021-05-24 PROCEDURE — 99214 OFFICE O/P EST MOD 30 MIN: CPT | Performed by: INTERNAL MEDICINE

## 2021-05-24 PROCEDURE — 73130 X-RAY EXAM OF HAND: CPT

## 2021-05-24 RX ORDER — ADALIMUMAB 40MG/0.4ML
KIT SUBCUTANEOUS
Qty: 2 EACH | Refills: 3 | Status: SHIPPED | OUTPATIENT
Start: 2021-05-24 | End: 2021-07-12 | Stop reason: SDUPTHER

## 2021-05-24 RX ORDER — FOLIC ACID 1 MG/1
TABLET ORAL
Qty: 90 TABLET | Refills: 3 | Status: SHIPPED | OUTPATIENT
Start: 2021-05-24 | End: 2022-07-12

## 2021-05-24 RX ORDER — PREDNISONE 10 MG/1
TABLET ORAL
Qty: 25 TABLET | Refills: 0 | Status: SHIPPED | OUTPATIENT
Start: 2021-05-24 | End: 2021-08-13 | Stop reason: SDUPTHER

## 2021-05-24 NOTE — PROGRESS NOTES
[facility] [place]                                                                                                                          (A) [phone] (F)      Dear Dr. hSawn Erickson:  Please find Rheumatology assessment. Thank you for giving me the opportunity to be involved in [facility] [place]'s care and I look forward following [facility] along with you. If you have any questions or concerns please feel free to reach me. Note is transcribed using voice recognition software. Inadvertent computerized transcription errors may be present. Patient identification: Jamia Nunez,: 1970,50 y.o. Sex: female     Assessment / Plan:  [facility] was seen today for follow-up. Diagnoses and all orders for this visit:    Rheumatoid arthritis involving multiple sites with positive rheumatoid factor (HCC)  -     XR HAND LEFT (MIN 3 VIEWS); Future  -     XR HAND RIGHT (MIN 3 VIEWS); Future    High risk medication use  -     CBC Auto Differential; Future  -     Comprehensive Metabolic Panel; Future  -     Quantiferon, Incubated; Future  -     C-Reactive Protein; Future  -     Sedimentation Rate; Future    Other orders  -     methotrexate (RHEUMATREX) 2.5 MG chemo tablet; Take 5 Tabs po once a week, same day every week  -     folic acid (FOLVITE) 1 MG tablet; Take 1 tab po daily.  -     Adalimumab (HUMIRA PEN) 40 MG/0.4ML PNKT; Inject 40 mg sc Q 14 days  -     predniSONE (DELTASONE) 10 MG tablet; 2 pills po q AM for 7 days. 1pill po Q AM for next 7 days. 1/2 pill po Q AM for next 7 days and stop. Seropositive erosive RA- symptoms approx 10 years, diagnosis- 2018. Active rheumatoid arthritis-off of RA meds for about 6 to 9 months. Desires to go back on RA meds.   Previously took Humira and methotrexate that has worked well. History of positive PPD in past, took [address] for 6-9 months-approximately 8 years ago, no history of any active tuberculosis. TB QuantiFERON test -negative from-6/7/2019    Plan-  Check safety labs as above. Resume RA meds as above. She is well versed with directions, side effects and monitoring as she took medication over a year. Prescriptions were sent as above. She was advised to call us in 2 weeks to follow-up on Humira PA. Treatment compliance reiterated as she is lost to follow-up since early part of January 2020. office visit in 3 months. Patient indicates understanding and agrees with the management plan. I reviewed patient's history, referral documents and electronic medical records. .  #######################################################################    Tlnllzlsxw-pjlnst-lj for seropositive rheumatoid arthritis. Interval changes-last seen in January 2020-lost to follow-up, came here because of worsening pain, swelling, stiffness in her finger joints, wrist, knees. Saw orthopedics, had Baker's cyst, recommend that she comes back to see her rheumatologist.  Therefore she made this appointment. States that because of Covid she did not keep her appointments or took her medications. Has been off of all meds over 6 to 9 months. Desires to go back on medications, had worked well. She tolerated methotrexate and Humira well in the past.All other review of systems are negative. Past Medical History:   Diagnosis Date    Depression      No past surgical history on file. FH- P Aunt - RA. Current Outpatient Medications   Medication Sig Dispense Refill    methotrexate (RHEUMATREX) 2.5 MG chemo tablet Take 5 Tabs po once a week, same day every week 66 tablet 0    folic acid (FOLVITE) 1 MG tablet Take 1 tab po daily.  90 tablet 3    Adalimumab (HUMIRA PEN) 40 MG/0.4ML PNKT Inject 40 mg sc Q 14 days 2 each 3    predniSONE (DELTASONE) 10 MG tablet 2 pills po q AM for 7 days. 1pill po Q AM for next 7 days. 1/2 pill po Q AM for next 7 days and stop. 25 tablet 0    Zolpidem Tartrate (AMBIEN PO) Take by mouth      FLUoxetine (PROZAC) 10 MG capsule Take 10 mg by mouth daily       No current facility-administered medications for this visit. No Known Allergies    PHYSICAL EXAM:    Vitals:    /84   Ht 5' 2\" (1.575 m)   Wt 153 lb (69.4 kg)   BMI 27.98 kg/m²   General appearance/ Psychiatric: well nourished, and well groomed, normal judgement, alert, appears stated age and cooperative. MKS:   28 joint JOINT COUNT:                               Right                                                  Left   Swell Tender Swell Tender   PIP1           PIP2 x  x       PIP3         PIP4       PIP5          MCP1           MCP2 x x  x   MCP3 x x  x   MCP4           MCP5    x       Wrist  x x  x   Elbow           Shoulder          Knee  x  x         Loose fist right hand. Right knee is tender in joint line, and range of motion associated with discomfort. Normal gait, muscle strength in upper and lower extremities bilaterally. Skin: No rashes, no induration or skin thickening or nodules. No evidence ischemia or deformities noted in digits or nails.     DATA:   Lab Results   Component Value Date    WBC 11.1 (H) 01/15/2020    HGB 12.9 01/15/2020    HCT 38.3 01/15/2020    .9 (H) 01/15/2020     01/15/2020         Chemistry        Component Value Date/Time     04/12/2018 1132    K 3.8 04/12/2018 1132     04/12/2018 1132    CO2 20 (L) 04/12/2018 1132    BUN 8 04/12/2018 1132    CREATININE 0.8 01/15/2020 1203        Component Value Date/Time    CALCIUM 8.6 04/12/2018 1132    ALKPHOS 104 10/15/2019 1251    AST 14 (L) 10/15/2019 1251    ALT 12 10/15/2019 1251    BILITOT <0.2 10/15/2019 1251          Lab Results   Component Value Date    LABURIC 5.9 04/12/2018     Lab Results   Component Value Date    SEDRATE 12 01/15/2020     Lab Results Component Value Date    CRP 5.9 (H) 01/15/2020          Radiology Review:   Left wrist X rays- joint space narrowing of carpal joints, erosions in carpal bones. A/P- See above.

## 2021-05-27 LAB
QUANTI TB GOLD PLUS: NEGATIVE
QUANTI TB1 MINUS NIL: 0 IU/ML (ref 0–0.34)
QUANTI TB2 MINUS NIL: 0 IU/ML (ref 0–0.34)
QUANTIFERON MITOGEN: 5.56 IU/ML
QUANTIFERON NIL: 0.01 IU/ML

## 2021-07-12 RX ORDER — ADALIMUMAB 40MG/0.4ML
KIT SUBCUTANEOUS
Qty: 2 EACH | Refills: 3 | Status: SHIPPED | OUTPATIENT
Start: 2021-07-12 | End: 2021-11-01

## 2021-08-13 RX ORDER — PREDNISONE 10 MG/1
TABLET ORAL
Qty: 25 TABLET | Refills: 0 | Status: SHIPPED | OUTPATIENT
Start: 2021-08-13 | End: 2021-11-01

## 2021-09-08 ENCOUNTER — OFFICE VISIT (OUTPATIENT)
Dept: RHEUMATOLOGY | Age: 51
End: 2021-09-08
Payer: COMMERCIAL

## 2021-09-08 VITALS
BODY MASS INDEX: 27.79 KG/M2 | SYSTOLIC BLOOD PRESSURE: 124 MMHG | DIASTOLIC BLOOD PRESSURE: 78 MMHG | HEIGHT: 62 IN | WEIGHT: 151 LBS

## 2021-09-08 DIAGNOSIS — M25.561 PAIN AND SWELLING OF RIGHT KNEE: ICD-10-CM

## 2021-09-08 DIAGNOSIS — M05.79 RHEUMATOID ARTHRITIS INVOLVING MULTIPLE SITES WITH POSITIVE RHEUMATOID FACTOR (HCC): Primary | ICD-10-CM

## 2021-09-08 DIAGNOSIS — Z79.899 HIGH RISK MEDICATION USE: ICD-10-CM

## 2021-09-08 DIAGNOSIS — M25.461 PAIN AND SWELLING OF RIGHT KNEE: ICD-10-CM

## 2021-09-08 LAB
ALT SERPL-CCNC: 14 U/L (ref 10–40)
AST SERPL-CCNC: 15 U/L (ref 15–37)
BASOPHILS ABSOLUTE: 0 K/UL (ref 0–0.2)
BASOPHILS RELATIVE PERCENT: 0.3 %
C-REACTIVE PROTEIN: 11.8 MG/L (ref 0–5.1)
CREAT SERPL-MCNC: 0.8 MG/DL (ref 0.6–1.1)
EOSINOPHILS ABSOLUTE: 0.1 K/UL (ref 0–0.6)
EOSINOPHILS RELATIVE PERCENT: 0.5 %
GFR AFRICAN AMERICAN: >60
GFR NON-AFRICAN AMERICAN: >60
HCT VFR BLD CALC: 40.3 % (ref 36–48)
HEMOGLOBIN: 13.4 G/DL (ref 12–16)
LYMPHOCYTES ABSOLUTE: 0.8 K/UL (ref 1–5.1)
LYMPHOCYTES RELATIVE PERCENT: 7.8 %
MCH RBC QN AUTO: 32.9 PG (ref 26–34)
MCHC RBC AUTO-ENTMCNC: 33.2 G/DL (ref 31–36)
MCV RBC AUTO: 99.1 FL (ref 80–100)
MONOCYTES ABSOLUTE: 0.6 K/UL (ref 0–1.3)
MONOCYTES RELATIVE PERCENT: 5.7 %
NEUTROPHILS ABSOLUTE: 9 K/UL (ref 1.7–7.7)
NEUTROPHILS RELATIVE PERCENT: 85.7 %
PDW BLD-RTO: 15.5 % (ref 12.4–15.4)
PLATELET # BLD: 282 K/UL (ref 135–450)
PMV BLD AUTO: 9.4 FL (ref 5–10.5)
RBC # BLD: 4.07 M/UL (ref 4–5.2)
SEDIMENTATION RATE, ERYTHROCYTE: 12 MM/HR (ref 0–30)
WBC # BLD: 10.5 K/UL (ref 4–11)

## 2021-09-08 PROCEDURE — 99215 OFFICE O/P EST HI 40 MIN: CPT | Performed by: INTERNAL MEDICINE

## 2021-09-08 PROCEDURE — 20610 DRAIN/INJ JOINT/BURSA W/O US: CPT | Performed by: INTERNAL MEDICINE

## 2021-09-08 RX ORDER — SODIUM CHLORIDE 9 MG/ML
INJECTION, SOLUTION INTRAVENOUS CONTINUOUS
Status: CANCELLED | OUTPATIENT
Start: 2021-09-15

## 2021-09-08 RX ORDER — DIPHENHYDRAMINE HYDROCHLORIDE 50 MG/ML
50 INJECTION INTRAMUSCULAR; INTRAVENOUS ONCE
Status: CANCELLED | OUTPATIENT
Start: 2021-09-15 | End: 2021-09-15

## 2021-09-08 RX ORDER — HEPARIN SODIUM (PORCINE) LOCK FLUSH IV SOLN 100 UNIT/ML 100 UNIT/ML
500 SOLUTION INTRAVENOUS PRN
Status: CANCELLED | OUTPATIENT
Start: 2021-09-15

## 2021-09-08 RX ORDER — SODIUM CHLORIDE 0.9 % (FLUSH) 0.9 %
5-40 SYRINGE (ML) INJECTION PRN
Status: CANCELLED | OUTPATIENT
Start: 2021-09-15

## 2021-09-08 RX ORDER — EPINEPHRINE 1 MG/ML
0.3 INJECTION, SOLUTION, CONCENTRATE INTRAVENOUS PRN
Status: CANCELLED | OUTPATIENT
Start: 2021-09-15

## 2021-09-08 RX ORDER — TRIAMCINOLONE ACETONIDE 40 MG/ML
40 INJECTION, SUSPENSION INTRA-ARTICULAR; INTRAMUSCULAR ONCE
Status: COMPLETED | OUTPATIENT
Start: 2021-09-08 | End: 2021-09-08

## 2021-09-08 RX ORDER — LIDOCAINE HYDROCHLORIDE 10 MG/ML
2 INJECTION, SOLUTION EPIDURAL; INFILTRATION; INTRACAUDAL; PERINEURAL ONCE
Status: COMPLETED | OUTPATIENT
Start: 2021-09-08 | End: 2021-09-08

## 2021-09-08 RX ORDER — PREDNISONE 10 MG/1
TABLET ORAL
Qty: 25 TABLET | Refills: 0 | Status: CANCELLED | OUTPATIENT
Start: 2021-09-08

## 2021-09-08 RX ORDER — SODIUM CHLORIDE 9 MG/ML
25 INJECTION, SOLUTION INTRAVENOUS PRN
Status: CANCELLED | OUTPATIENT
Start: 2021-09-15

## 2021-09-08 RX ORDER — METHYLPREDNISOLONE SODIUM SUCCINATE 125 MG/2ML
125 INJECTION, POWDER, LYOPHILIZED, FOR SOLUTION INTRAMUSCULAR; INTRAVENOUS ONCE
Status: CANCELLED | OUTPATIENT
Start: 2021-09-15 | End: 2021-09-15

## 2021-09-08 RX ADMIN — TRIAMCINOLONE ACETONIDE 40 MG: 40 INJECTION, SUSPENSION INTRA-ARTICULAR; INTRAMUSCULAR at 11:10

## 2021-09-08 RX ADMIN — LIDOCAINE HYDROCHLORIDE 2 ML: 10 INJECTION, SOLUTION EPIDURAL; INFILTRATION; INTRACAUDAL; PERINEURAL at 11:10

## 2021-09-08 NOTE — PATIENT INSTRUCTIONS
Patient Education        abatacept  Pronunciation:  a BAY ta sept  Brand:  Mary  What is the most important information I should know about abatacept? Follow all directions on your medicine label and package. Tell each of your healthcare providers about all your medical conditions, allergies, and all medicines you use. What is abatacept? Abatacept is used to treat symptoms of rheumatoid arthritis, and to prevent joint damage caused by these conditions. This medicine is for adults and children at least 3years old. Abatacept is also used to treat active psoriatic arthritis in adults. Abatacept is not a cure for any autoimmune disorder and will only treat the symptoms of your condition. Abatacept may also be used for purposes not listed in this medication guide. What should I discuss with my healthcare provider before using abatacept? You should not use abatacept if you are allergic to it. Before using abatacept, tell your doctor if you have ever had tuberculosis, if anyone in your household has tuberculosis, or if you have recently traveled to an area where tuberculosis is common. Tell your doctor if you have ever had:  · a weak immune system;  · any type of infection including a skin infection or open sores;  · infections that go away and come back;  · COPD (chronic obstructive pulmonary disease);  · diabetes;  · hepatitis; or  · if you are scheduled to receive any vaccines. Using abatacept may increase your risk of developing certain types of cancer such as lymphoma (cancer of the lymph nodes). This risk may be greater in older adults. Talk to your doctor about your specific risk. Tell your doctor if you are pregnant or breastfeeding. If you are pregnant, your name may be listed on a pregnancy registry to track the effects of abatacept on the baby. Children using abatacept should be current on all childhood immunizations before starting treatment. How should I use abatacept?   Before you start treatment with abatacept, your doctor may perform tests to make sure you do not have tuberculosis or other infections. Abatacept is injected under the skin, or as an infusion into a vein. A healthcare provider will give your first dose and may teach you how to properly use the medication by yourself. Abatacept is injected under the skin when given to a child between 3and 10years old. Abatacept must be given slowly when injected into a vein, and the IV infusion can take at least 30 minutes to complete. This medicine is usually given every 1 to 4 weeks. Follow your doctor's instructions. Abatacept must be mixed with a liquid (diluent) before using it. When using injections by yourself, be sure you understand how to properly mix and store the medicine. Read and carefully follow any Instructions for Use provided with your medicine. Ask your doctor or pharmacist if you don't understand all instructions. Prepare an injection only when you are ready to give it. Gently swirl but do not shake the medication bottle. Do not use if the medicine looks cloudy, has changed colors, or has particles in it. Call your pharmacist for new medicine. Each vial (bottle) or prefilled syringe is for one use only. Throw it away after one use, even if there is still medicine left inside. Use a needle and syringe only once and then place them in a puncture-proof \"sharps\" container. Follow state or local laws about how to dispose of this container. Keep it out of the reach of children and pets. If you need surgery, tell the surgeon ahead of time that you are using abatacept. If you've ever had hepatitis B, using abatacept can cause this virus to become active or get worse. You may need frequent liver function tests while using this medicine and for several months after you stop. Abatacept can cause false results with certain blood glucose tests, showing high blood sugar readings.  If you have diabetes, talk to your doctor about the best way to test your blood sugar. Autoimmune disorders are often treated with a combination of different drugs. Use all medications as directed and read all medication guides you receive. Do not change your dose or dosing schedule without your doctor's advice. Store abatacept in original carton in a refrigerator. Protect from light and do not freeze. Do not use after the expiration date on the medicine label has passed. If you need to travel with your medicine, place the syringes in a cooler with ice packs. Abatacept mixed with a diluent may be stored in a refrigerator or at room temperature and must be used within 24 hours. What happens if I miss a dose? Call your doctor for instructions if you miss your abatacept dose. What happens if I overdose? Seek emergency medical attention or call the Poison Help line at [phone]. What should I avoid while using abatacept? Do not receive a \"live\" vaccine while using abatacept, and for at least 3 months after your treatment ends. The vaccine may not work as well during this time, and may not fully protect you from disease. Live vaccines include measles, mumps, rubella (MMR), rotavirus, typhoid, yellow fever, varicella (chickenpox), zoster (shingles), and nasal flu (influenza) vaccine. Avoid being near people who are sick or have infections. Tell your doctor at once if you develop signs of infection. What are the possible side effects of abatacept? Get emergency medical help if you have signs of an allergic reaction:  hives; difficulty breathing; swelling of your face, lips, tongue, or throat. Some side effects may occur during the injection. Tell your caregiver right away if you feel dizzy, light-headed, itchy, or have a severe headache or trouble breathing within 1 hour after receiving the injection. You may get infections more easily, even serious or fatal infections.  Call your doctor right away if you have signs of infection such as:  · fever, chills, night sweats, flu symptoms, weight loss;  · feeling very tired;  · dry cough, sore throat; or  · warmth, pain, or redness of your skin. Call your doctor at once if you have any of these other serious side effects:  · trouble breathing;  · stabbing chest pain, wheezing, cough with yellow or green mucus;  · pain or burning when you urinate; or  · signs of skin infection such as itching, swelling, warmth, redness, or oozing. Common side effects may include:  · fever;  · nausea, diarrhea, stomach pain;  · headache; or  · cold symptoms such as stuffy nose, sneezing, sore throat, cough. This is not a complete list of side effects and others may occur. Call your doctor for medical advice about side effects. You may report side effects to FDA at 3-808-FDA-4729. What other drugs will affect abatacept? Tell your doctor about all your other medicines, especially:  · adalimumab;  · anakinra;  · certolizumab;  · etanercept;  · golimumab;  · infliximab;  · rituximab; or  · tocilizumab. This list is not complete. Other drugs may affect abatacept, including prescription and over-the-counter medicines, vitamins, and herbal products. Not all possible drug interactions are listed here. Where can I get more information? Your doctor or pharmacist can provide more information about abatacept. Remember, keep this and all other medicines out of the reach of children, never share your medicines with others, and use this medication only for the indication prescribed. Every effort has been made to ensure that the information provided by [address] is accurate, up-to-date, and complete, but no guarantee is made to that effect. Drug information contained herein may be time sensitive.  [employer] information has been compiled for use by healthcare practitioners and consumers in the [place] and therefore [employer] does not warrant that uses outside of the [place] are appropriate, unless specifically indicated otherwise. [facility] drug information does not endorse drugs, diagnose patients or recommend therapy. [facility] drug information is an informational resource designed to assist licensed healthcare practitioners in caring for their patients and/or to serve consumers viewing this service as a supplement to, and not a substitute for, the expertise, skill, knowledge and judgment of healthcare practitioners. The absence of a warning for a given drug or drug combination in no way should be construed to indicate that the drug or drug combination is safe, effective or appropriate for any given patient. [facility] does not assume any responsibility for any aspect of healthcare administered with the aid of information [facility] provides. The information contained herein is not intended to cover all possible uses, directions, precautions, warnings, drug interactions, allergic reactions, or adverse effects. If you have questions about the drugs you are taking, check with your doctor, nurse or pharmacist.  Copyright 0852-9160 [address]. Version: 9.01. Revision date: 11/2/2020. Care instructions adapted under license by [facility] ([facility]). If you have questions about a medical condition or this instruction, always ask your healthcare professional. [place] [address] any warranty or liability for your use of this information.

## 2021-09-08 NOTE — PROGRESS NOTES
[address] [facility] - [place], [place]                                                                                                                          (E) [phone] (F)      Dear Dr. Jodee Bee:  Please find Rheumatology assessment. Thank you for giving me the opportunity to be involved in Wm Nunez's care and I look forward following Wm Georges along with you. If you have any questions or concerns please feel free to reach me. Note is transcribed using voice recognition software. Inadvertent computerized transcription errors may be present. Patient identification: Angelika Nunez,: 1970,51 y.o. Sex: female     Assessment / Plan:  Wm Georges was seen today for follow-up. Diagnoses and all orders for this visit:    Rheumatoid arthritis involving multiple sites with positive rheumatoid factor (HCC)    High risk medication use  -     ID ARTHROCENTESIS ASPIR&/INJ MAJOR JT/BURSA W/O US  -     AST(SGOT) & ALT(SGPT)  -     CBC Auto Differential  -     C-Reactive Protein  -     Sedimentation Rate  -     Creatinine, Serum    Pain and swelling of right knee  -     triamcinolone acetonide (KENALOG-40) injection 40 mg  -     lidocaine PF 1 % injection 2 mL    Other orders  -     methotrexate (RHEUMATREX) 2.5 MG chemo tablet; Take 5 Tabs po once a week, same day every week,      Seropositive erosive RA- symptoms approx 10 years, diagnosis- 2018. Active disease on methotrexate and Humira. Multiple inflamed finger joints and large inflammatory right knee effusion aspirated and injected with Kenalog. History of positive PPD in past, took [address] [employer] for 6-9 months-approximately 8 years ago, no history of any active tuberculosis.   TB QuantiFERON test -negative from-2019    Plan-  Check 348 05/24/2021         Chemistry        Component Value Date/Time     05/24/2021 1223    K 4.3 05/24/2021 1223     05/24/2021 1223    CO2 20 (L) 05/24/2021 1223    BUN 9 05/24/2021 1223    CREATININE 0.7 05/24/2021 1223        Component Value Date/Time    CALCIUM 8.8 05/24/2021 1223    ALKPHOS 140 (H) 05/24/2021 1223    AST 16 05/24/2021 1223    ALT 16 05/24/2021 1223    BILITOT <0.2 05/24/2021 1223          Lab Results   Component Value Date    LABURIC 5.9 04/12/2018     Lab Results   Component Value Date    SEDRATE 46 (H) 05/24/2021     Lab Results   Component Value Date    CRP 39.5 (H) 05/24/2021          Radiology Review:   Left wrist X rays- joint space narrowing of carpal joints, erosions in carpal bones. A/P- See above. Total time >40 minutes that includes the following-  Preparing to see the patient such as reviewing patients records, pre-charting, preparing the visit on the same day, performing a medically appropriate history and physical examination, counseling and educating patient about diagnosis, management plan, ordering appropriate testings, prescriptions, communicating findings to other care providers, and documenting clinical information in electronic medical record.

## 2021-09-30 RX ORDER — EPINEPHRINE 1 MG/ML
0.3 INJECTION, SOLUTION, CONCENTRATE INTRAVENOUS PRN
Status: CANCELLED | OUTPATIENT
Start: 2021-10-07

## 2021-09-30 RX ORDER — SODIUM CHLORIDE 0.9 % (FLUSH) 0.9 %
5-40 SYRINGE (ML) INJECTION PRN
Status: CANCELLED | OUTPATIENT
Start: 2021-10-07

## 2021-09-30 RX ORDER — DIPHENHYDRAMINE HYDROCHLORIDE 50 MG/ML
50 INJECTION INTRAMUSCULAR; INTRAVENOUS ONCE
Status: CANCELLED | OUTPATIENT
Start: 2021-10-07 | End: 2021-10-07

## 2021-09-30 RX ORDER — SODIUM CHLORIDE 9 MG/ML
INJECTION, SOLUTION INTRAVENOUS CONTINUOUS
Status: CANCELLED | OUTPATIENT
Start: 2021-10-07

## 2021-09-30 RX ORDER — SODIUM CHLORIDE 9 MG/ML
25 INJECTION, SOLUTION INTRAVENOUS PRN
Status: CANCELLED | OUTPATIENT
Start: 2021-10-07

## 2021-09-30 RX ORDER — SODIUM CHLORIDE 9 MG/ML
INJECTION, SOLUTION INTRAVENOUS ONCE
Status: CANCELLED | OUTPATIENT
Start: 2021-10-07 | End: 2021-10-07

## 2021-09-30 RX ORDER — METHYLPREDNISOLONE SODIUM SUCCINATE 125 MG/2ML
125 INJECTION, POWDER, LYOPHILIZED, FOR SOLUTION INTRAMUSCULAR; INTRAVENOUS ONCE
Status: CANCELLED | OUTPATIENT
Start: 2021-10-07 | End: 2021-10-07

## 2021-09-30 RX ORDER — HEPARIN SODIUM (PORCINE) LOCK FLUSH IV SOLN 100 UNIT/ML 100 UNIT/ML
500 SOLUTION INTRAVENOUS PRN
Status: CANCELLED | OUTPATIENT
Start: 2021-10-07

## 2021-11-01 ENCOUNTER — HOSPITAL ENCOUNTER (OUTPATIENT)
Dept: ONCOLOGY | Age: 51
Setting detail: INFUSION SERIES
Discharge: HOME | End: 2021-11-01
Payer: COMMERCIAL

## 2021-11-01 VITALS
SYSTOLIC BLOOD PRESSURE: 134 MMHG | OXYGEN SATURATION: 100 % | WEIGHT: 155.2 LBS | BODY MASS INDEX: 28.39 KG/M2 | DIASTOLIC BLOOD PRESSURE: 85 MMHG | TEMPERATURE: 97.7 F | RESPIRATION RATE: 17 BRPM | HEART RATE: 87 BPM

## 2021-11-01 DIAGNOSIS — M05.79 RHEUMATOID ARTHRITIS INVOLVING MULTIPLE SITES WITH POSITIVE RHEUMATOID FACTOR (HCC): Primary | ICD-10-CM

## 2021-11-01 PROCEDURE — 6360000002 HC RX W HCPCS: Performed by: INTERNAL MEDICINE

## 2021-11-01 PROCEDURE — 2580000003 HC RX 258: Performed by: INTERNAL MEDICINE

## 2021-11-01 PROCEDURE — 96365 THER/PROPH/DIAG IV INF INIT: CPT

## 2021-11-01 RX ORDER — HEPARIN SODIUM (PORCINE) LOCK FLUSH IV SOLN 100 UNIT/ML 100 UNIT/ML
500 SOLUTION INTRAVENOUS PRN
Status: CANCELLED | OUTPATIENT
Start: 2021-11-29

## 2021-11-01 RX ORDER — SODIUM CHLORIDE 9 MG/ML
25 INJECTION, SOLUTION INTRAVENOUS PRN
Status: CANCELLED | OUTPATIENT
Start: 2021-11-29

## 2021-11-01 RX ORDER — DIPHENHYDRAMINE HYDROCHLORIDE 50 MG/ML
50 INJECTION INTRAMUSCULAR; INTRAVENOUS ONCE
Status: CANCELLED | OUTPATIENT
Start: 2021-11-29 | End: 2021-11-29

## 2021-11-01 RX ORDER — SODIUM CHLORIDE 0.9 % (FLUSH) 0.9 %
5-40 SYRINGE (ML) INJECTION PRN
Status: CANCELLED | OUTPATIENT
Start: 2021-11-29

## 2021-11-01 RX ORDER — SODIUM CHLORIDE 9 MG/ML
INJECTION, SOLUTION INTRAVENOUS ONCE
Status: CANCELLED | OUTPATIENT
Start: 2021-11-29 | End: 2021-11-29

## 2021-11-01 RX ORDER — SODIUM CHLORIDE 9 MG/ML
INJECTION, SOLUTION INTRAVENOUS CONTINUOUS
Status: CANCELLED | OUTPATIENT
Start: 2021-11-29

## 2021-11-01 RX ORDER — M-VIT,TX,IRON,MINS/CALC/FOLIC 27MG-0.4MG
1 TABLET ORAL DAILY
COMMUNITY

## 2021-11-01 RX ORDER — SODIUM CHLORIDE 9 MG/ML
INJECTION, SOLUTION INTRAVENOUS ONCE
Status: COMPLETED | OUTPATIENT
Start: 2021-11-01 | End: 2021-11-01

## 2021-11-01 RX ORDER — METHYLPREDNISOLONE SODIUM SUCCINATE 125 MG/2ML
125 INJECTION, POWDER, LYOPHILIZED, FOR SOLUTION INTRAMUSCULAR; INTRAVENOUS ONCE
Status: CANCELLED | OUTPATIENT
Start: 2021-11-29 | End: 2021-11-29

## 2021-11-01 RX ADMIN — GOLIMUMAB 141.25 MG: 50 SOLUTION INTRAVENOUS at 11:55

## 2021-11-01 RX ADMIN — SODIUM CHLORIDE: 9 INJECTION, SOLUTION INTRAVENOUS at 11:55

## 2021-11-24 ENCOUNTER — TELEPHONE (OUTPATIENT)
Dept: RHEUMATOLOGY | Age: 51
End: 2021-11-24

## 2021-11-24 ENCOUNTER — OFFICE VISIT (OUTPATIENT)
Dept: RHEUMATOLOGY | Age: 51
End: 2021-11-24
Payer: COMMERCIAL

## 2021-11-24 VITALS
SYSTOLIC BLOOD PRESSURE: 130 MMHG | DIASTOLIC BLOOD PRESSURE: 80 MMHG | WEIGHT: 153 LBS | HEIGHT: 62 IN | BODY MASS INDEX: 28.16 KG/M2

## 2021-11-24 DIAGNOSIS — Z79.899 HIGH RISK MEDICATION USE: ICD-10-CM

## 2021-11-24 DIAGNOSIS — M05.79 RHEUMATOID ARTHRITIS INVOLVING MULTIPLE SITES WITH POSITIVE RHEUMATOID FACTOR (HCC): Primary | ICD-10-CM

## 2021-11-24 DIAGNOSIS — M25.562 CHRONIC PAIN OF BOTH KNEES: ICD-10-CM

## 2021-11-24 DIAGNOSIS — M25.561 CHRONIC PAIN OF BOTH KNEES: ICD-10-CM

## 2021-11-24 DIAGNOSIS — G89.29 CHRONIC PAIN OF BOTH KNEES: ICD-10-CM

## 2021-11-24 LAB
A/G RATIO: 1.3 (ref 1.1–2.2)
ALBUMIN SERPL-MCNC: 4.4 G/DL (ref 3.4–5)
ALP BLD-CCNC: 150 U/L (ref 40–129)
ALT SERPL-CCNC: 13 U/L (ref 10–40)
ANION GAP SERPL CALCULATED.3IONS-SCNC: 16 MMOL/L (ref 3–16)
AST SERPL-CCNC: 14 U/L (ref 15–37)
BASOPHILS ABSOLUTE: 0 K/UL (ref 0–0.2)
BASOPHILS RELATIVE PERCENT: 0.4 %
BILIRUB SERPL-MCNC: <0.2 MG/DL (ref 0–1)
BUN BLDV-MCNC: 8 MG/DL (ref 7–20)
C-REACTIVE PROTEIN: 59.4 MG/L (ref 0–5.1)
CALCIUM SERPL-MCNC: 9.2 MG/DL (ref 8.3–10.6)
CHLORIDE BLD-SCNC: 103 MMOL/L (ref 99–110)
CO2: 19 MMOL/L (ref 21–32)
CREAT SERPL-MCNC: 0.7 MG/DL (ref 0.6–1.1)
EOSINOPHILS ABSOLUTE: 0.1 K/UL (ref 0–0.6)
EOSINOPHILS RELATIVE PERCENT: 1.5 %
GFR AFRICAN AMERICAN: >60
GFR NON-AFRICAN AMERICAN: >60
GLUCOSE BLD-MCNC: 61 MG/DL (ref 70–99)
HCT VFR BLD CALC: 35 % (ref 36–48)
HEMOGLOBIN: 11.7 G/DL (ref 12–16)
LYMPHOCYTES ABSOLUTE: 1.6 K/UL (ref 1–5.1)
LYMPHOCYTES RELATIVE PERCENT: 23 %
MCH RBC QN AUTO: 32.4 PG (ref 26–34)
MCHC RBC AUTO-ENTMCNC: 33.4 G/DL (ref 31–36)
MCV RBC AUTO: 97 FL (ref 80–100)
MONOCYTES ABSOLUTE: 0.7 K/UL (ref 0–1.3)
MONOCYTES RELATIVE PERCENT: 9.8 %
NEUTROPHILS ABSOLUTE: 4.5 K/UL (ref 1.7–7.7)
NEUTROPHILS RELATIVE PERCENT: 65.3 %
PDW BLD-RTO: 14 % (ref 12.4–15.4)
PLATELET # BLD: 392 K/UL (ref 135–450)
PMV BLD AUTO: 8.8 FL (ref 5–10.5)
POTASSIUM SERPL-SCNC: 4.5 MMOL/L (ref 3.5–5.1)
RBC # BLD: 3.61 M/UL (ref 4–5.2)
SEDIMENTATION RATE, ERYTHROCYTE: 62 MM/HR (ref 0–30)
SODIUM BLD-SCNC: 138 MMOL/L (ref 136–145)
TOTAL PROTEIN: 7.7 G/DL (ref 6.4–8.2)
WBC # BLD: 6.9 K/UL (ref 4–11)

## 2021-11-24 PROCEDURE — 99214 OFFICE O/P EST MOD 30 MIN: CPT | Performed by: INTERNAL MEDICINE

## 2021-11-24 RX ORDER — PREDNISONE 10 MG/1
TABLET ORAL
Qty: 35 TABLET | Refills: 0 | Status: SHIPPED | OUTPATIENT
Start: 2021-11-24 | End: 2022-02-14 | Stop reason: SDDI

## 2021-11-24 NOTE — TELEPHONE ENCOUNTER
See referral from 9/30/2021. Patient started Freeman Mariel at [facility] 11/1/2021 and is due again 11/29/2021 at [facility]. Insurance approved 2 doses at [facility]. Dr Stevens He requesting after completing treatment on 11/29/2021 patient continue care at [facility] infusion center. Will request VOB after 11/29/2021 visit, after VOB received will begin prior authorzation process. Will be due Next infusion 1/24/2022    Zac Hathaway ()   Dose: 2 mg/kg Every 8 weeks.    (loading 0, 4 week at [facility])  Infusion code: 19317  Dx: M05.79

## 2021-11-24 NOTE — PROGRESS NOTES
[address] [facility] [place], [place]                                                                                                                          (N) [phone] (F)      Dear Dr. Mariaa Sweeney:  Please find Rheumatology assessment. Thank you for giving me the opportunity to be involved in Israel Nunez's care and I look forward following Israel Hurtado along with you. If you have any questions or concerns please feel free to reach me. Note is transcribed using voice recognition software. Inadvertent computerized transcription errors may be present. Patient identification: Cece Nunez,: 1970,51 y.o. Sex: female     Assessment / Plan:  Israel Hurtado was seen today for follow-up. Diagnoses and all orders for this visit:    Rheumatoid arthritis involving multiple sites with positive rheumatoid factor ([facility] [address]    High risk medication use  -     Comprehensive Metabolic Panel; Future  -     C-Reactive Protein; Future  -     Sedimentation Rate; Future  -     CBC Auto Differential; Future    Chronic pain of both knees    Other orders  -     methotrexate (RHEUMATREX) 2.5 MG chemo tablet; Take 8 Tabs po once a week, same day every week,  split dose 6-8 hours apart on the same day. -     predniSONE (DELTASONE) 10 MG tablet; 2 tab po daily x 7 days. 1.5 tab po daily x 7 days. 1 tab po daily x 7 days. 1/2 tab po daily 7 days and stop. Seropositive erosive RA-  Official diagnosis- 2018- symptoms for a much longer duration. Active disease in her knees and finger joints. History of positive PPD in past, took [address] [employer] for 6-9 months-approximately 8 years ago, no history of any active tuberculosis. TB QuantiFERON test -negative from-2019    Plan-  Increase Methotrexate 20 mg weekly.   DEIRDRE Colunga EXAM:    Vitals:    /80   Ht 5' 2\" (1.575 m)   Wt 153 lb (69.4 kg)   BMI 27.98 kg/m²   General appearance/ Psychiatric: well nourished, and well groomed, normal judgement, alert, appears stated age and cooperative. MKS:   28 joint JOINT COUNT:                               Right                                                  Left   Swell Tender Swell Tender   PIP1           PIP2         PIP3       PIP4       PIP5          MCP1           MCP2 x x     MCP3 x x     MCP4           MCP5           Wrist   x  x   Elbow           Shoulder          Knee  xx  x         Full fist. Right knee - warm, tender, moderate effusion  Left knee- sub pain, no objective findings. Skin: No rashes, no induration or skin thickening or nodules. No evidence ischemia or deformities noted in digits or nails. DATA:   Lab Results   Component Value Date    WBC 6.9 11/24/2021    HGB 11.7 (L) 11/24/2021    HCT 35.0 (L) 11/24/2021    MCV 97.0 11/24/2021     11/24/2021         Chemistry        Component Value Date/Time     11/24/2021 1050    K 4.5 11/24/2021 1050     11/24/2021 1050    CO2 19 (L) 11/24/2021 1050    BUN 8 11/24/2021 1050    CREATININE 0.7 11/24/2021 1050        Component Value Date/Time    CALCIUM 9.2 11/24/2021 1050    ALKPHOS 150 (H) 11/24/2021 1050    AST 14 (L) 11/24/2021 1050    ALT 13 11/24/2021 1050    BILITOT <0.2 11/24/2021 1050          Lab Results   Component Value Date    LABURIC 5.9 04/12/2018     Lab Results   Component Value Date    SEDRATE 62 (H) 11/24/2021     Lab Results   Component Value Date    CRP 59.4 (H) 11/24/2021          Radiology Review:   Left wrist X rays- joint space narrowing of carpal joints, erosions in carpal bones. A/P- See above.     Total time 35 minutes that includes the following-  Preparing to see the patient such as reviewing patients records, pre-charting, preparing the visit on the same day, performing a medically appropriate history and physical examination, counseling and educating patient about diagnosis, management plan, ordering appropriate testings, prescriptions, communicating findings to other care providers, and documenting clinical information in electronic medical record.

## 2021-11-30 NOTE — TELEPHONE ENCOUNTER
VOB requested in [facility] portal.  Will be signed up for copay asst.

## 2021-12-06 NOTE — TELEPHONE ENCOUNTER
Called [phone] and spoke with Nghia Miller.  Pending PA [phone] Rahul Talisha   A PA form will be faxed to me and I will fill out form and fax back with clinicals      Received PA form for Damir Sommer filled out the form and faxed back with clinicals and labs        Authorization Status:APPROVAL  Authorization Number:[id number]  Approved : [address]  Date Span:Start-12/11/2021  End-12/11/2022

## 2021-12-13 ENCOUNTER — TELEPHONE (OUTPATIENT)
Dept: RHEUMATOLOGY | Age: 51
End: 2021-12-13

## 2021-12-13 NOTE — TELEPHONE ENCOUNTER
Left voice mail message, requesting call back to schedule infusion at [facility] office. Have  12/13/2021 at 330 pm available and on 12/15/2021 730 am, 830 am, and 1030 am.  Waiting return call.

## 2021-12-15 ENCOUNTER — NURSE ONLY (OUTPATIENT)
Dept: RHEUMATOLOGY | Age: 51
End: 2021-12-15
Payer: COMMERCIAL

## 2021-12-15 VITALS
BODY MASS INDEX: 28.72 KG/M2 | SYSTOLIC BLOOD PRESSURE: 138 MMHG | WEIGHT: 157 LBS | RESPIRATION RATE: 16 BRPM | HEART RATE: 86 BPM | TEMPERATURE: 98 F | DIASTOLIC BLOOD PRESSURE: 84 MMHG

## 2021-12-15 DIAGNOSIS — M05.79 RHEUMATOID ARTHRITIS INVOLVING MULTIPLE SITES WITH POSITIVE RHEUMATOID FACTOR (HCC): Primary | ICD-10-CM

## 2021-12-15 PROCEDURE — 96413 CHEMO IV INFUSION 1 HR: CPT | Performed by: INTERNAL MEDICINE

## 2021-12-15 RX ORDER — EPINEPHRINE 1 MG/ML
0.3 INJECTION, SOLUTION, CONCENTRATE INTRAVENOUS PRN
Status: CANCELLED | OUTPATIENT
Start: 2021-12-29

## 2021-12-15 RX ORDER — SODIUM CHLORIDE 9 MG/ML
25 INJECTION, SOLUTION INTRAVENOUS PRN
Status: CANCELLED | OUTPATIENT
Start: 2021-12-29

## 2021-12-15 RX ORDER — DIPHENHYDRAMINE HYDROCHLORIDE 50 MG/ML
50 INJECTION INTRAMUSCULAR; INTRAVENOUS ONCE
Status: CANCELLED | OUTPATIENT
Start: 2021-12-29 | End: 2021-12-29

## 2021-12-15 RX ORDER — METHYLPREDNISOLONE SODIUM SUCCINATE 125 MG/2ML
125 INJECTION, POWDER, LYOPHILIZED, FOR SOLUTION INTRAMUSCULAR; INTRAVENOUS ONCE
Status: CANCELLED | OUTPATIENT
Start: 2021-12-29 | End: 2021-12-29

## 2021-12-15 RX ORDER — HEPARIN SODIUM (PORCINE) LOCK FLUSH IV SOLN 100 UNIT/ML 100 UNIT/ML
500 SOLUTION INTRAVENOUS PRN
Status: CANCELLED | OUTPATIENT
Start: 2021-12-29

## 2021-12-15 RX ORDER — SODIUM CHLORIDE 9 MG/ML
INJECTION, SOLUTION INTRAVENOUS CONTINUOUS
Status: CANCELLED | OUTPATIENT
Start: 2021-12-29

## 2021-12-15 RX ORDER — SODIUM CHLORIDE 9 MG/ML
INJECTION, SOLUTION INTRAVENOUS ONCE
Status: CANCELLED | OUTPATIENT
Start: 2021-12-29 | End: 2021-12-29

## 2021-12-15 RX ORDER — SODIUM CHLORIDE 0.9 % (FLUSH) 0.9 %
5-40 SYRINGE (ML) INJECTION PRN
Status: CANCELLED | OUTPATIENT
Start: 2021-12-29

## 2021-12-15 NOTE — PROGRESS NOTES
Arrived at 1009 am for 1030 am appointment. First infusion at [facility] Transferred care to [facility] office per Dr Shawn White. Delay with dose #2, d/t waiting on prior authorization from insurance. Pt here for Simponi Aria infusion #2. No  Adverse effects from previous infusion. No premedications required today. Today 157 lbs =71 kg   X 2 mg/kg = 142 mg, Rounded to 150 mg per MD.  Infusion  tolerated well. Educated patient on today's  medication,  visit process, infusion appointments, copay assistance program, and when to notify office. Information added to AVS. Questions answered. Next visit scheduled  in 8 weeks.      IV Gauge: #22  IV Site: right antecubital  # of Attempts: 1  IV Start: 1030 am  IV Stop: 1100 am      See Therapy Plan, flowsheets and MAR

## 2021-12-15 NOTE — PATIENT INSTRUCTIONS
Contact: Lexi Manjarrez (Jessica Weeks): [phone] to coordinate co-pay assistance. Suresh Veena go to [employer]. [employer]. For [employer] go to [employer]    Thank you for choosing [facility] ([facility]) for your infusion. We look forward to serving you! The Day of Your Infusion   On the day of your appointment, please arrive 5-10 minutes early. You will go to the  and check in. Preparing for Your Infusion   Please be sure to drink at least 16 ounces of water, bring a list of your current medications, insurance card, and copayment. Please wear comfortable clothes and dress in layers. You are welcome to bring a blanket and pillow, your laptop/tablet, and whatever you might need to be comfortable and pass the time during your infusion. When to Cancel   Please contact your physician if you have a fever, infection, are on antibiotics, have yellow/brown/green drainage, or plan to have surgery. If you have any questions about whether you should receive your infusion, please contact your physician. If you need to cancel your appointment, please call at least 48 hours in advance. If you are more than 15 minutes late, you may be asked to reschedule. In the event of an emergency or reaction, please call 911 or contact your prescriber. We look forward to caring for you. If you have any questions or concerns, please feel free to call or message us thru [address] [place] location:   [facility] location: [phone]      Your Responsibilities:   Provide accurate and complete information about your health, address, telephone number, date of birth, insurance carrier and employer.  Call if you cannot keep your appointment.  Be respectful of your [place] team.    Please remember that the space is shared with other patients.  [facility] asks that you be considerate of your language and conduct.  Give us a copy of your advance directive.  Ask questions if there is anything you do not understand.  Report unexpected changes in your health.  Take responsibility for the consequences of refusing care or not following instructions.  Pay your bills and take responsibility for your copay assistance. Is compliance to medication therapy important? Our nurses understand how crucial a patient's compliance to their prescribed therapy is to the outcomes of the therapy program for their specific disease state. Noncompliance or poor compliance to the patient medication treatment plan significantly increases the risk of poor health outcomes while increasing the risk of an adverse event, overall cost of care, and/or negative impacts on quality of life. As a partner in your healthcare, we want you to understand that infusion therapy is typically a long term commitment. The treatment decided by you and your physician will require compliance on your part. Reminder Appointments are schedule one appointment ahead if you come every 6-8 weeks, and 2 ahead if you come every 4 weeks. Please know that we cannot infuse if the physician is not present. If we cannot accommodate your needs, please feel free to ask what other options are available. The nurse will do the best to accommodate your needs , there may be times that this is not always possible. Please be patient as we try to accommodate patients in a safe, and timely manner. If the physician is out, be aware that you may need to go to our [facility] to have your treatment in a timely manner. If you have questions, please speak with a member of the Rheumatology staff. We are here to help and guide you through this infusion process and ensure that you get the treatment that you need.        golimumab  Pronunciation:  angie gomez mab  Brand:  Zac Frank  What is the most important information I should know about golimumab? You may get infections more easily, even serious or fatal infections. Call your doctor if you have a fever, chills, aches, tiredness, cough, skin sores, diarrhea, or burning when you urinate. If you have ever had tuberculosis or hepatitis B, golimumab can cause these conditions to come back or get worse. You should be tested for these conditions before you start using golimumab. What is golimumab? Golimumab is used to treat rheumatoid arthritis and ankylosing spondylitis in adults. Golimumab is also used to treat psoriatic arthritis and polyarticular juvenile idiopathic arthritis in adults and children at least 3years old. Golimumab is also used to treat ulcerative colitis (UC) in adults when other medicines have not worked or could not be tolerated. Golimumab is sometimes given with methotrexate or a steroid medicine. Golimumab may also be used for purposes not listed in this medication guide. What should I discuss with my healthcare provider before using golimumab? You should not use golimumab if you are allergic to it. Tell your doctor if you have ever had:  · active or chronic infections;  · a nerve-muscle disease such as multiple sclerosis or Guillain-Barré syndrome;  · cancer or lymphoma;  · congestive heart failure;  · psoriasis; or  · if you are scheduled to receive any vaccine. Tell your doctor if you have ever had tuberculosis or if anyone in your household has tuberculosis. Also tell your doctor if you have recently traveled. Tuberculosis and some fungal infections are more common in certain parts of the world, and you may have been exposed during travel. Using golimumab may increase your risk of developing autoimmune disorders or cancer, including skin cancer. Talk to your doctor about this risk and what symptoms to watch for.   Golimumab may cause a rare type of lymphoma (cancer) of the liver, spleen, and bone marrow that can be fatal. This has occurred mainly in teenagers and young men with Crohn's disease or ulcerative colitis. Anyone with an inflammatory autoimmune disorder may have a higher risk of lymphoma. Talk with your doctor about your own risk. Tell your doctor if you are pregnant or plan to become pregnant. Your baby could have an increased risk of infection for up to 6 months if you use golimumab during pregnancy. If you use golimumab during pregnancy, your  baby should not receive a live vaccine for at least 6 months after you last received a dose of golimumab. You should not breastfeed while using this medicine. How should I use golimumab? Follow all directions on your prescription label and read all medication guides or instruction sheets. Use the medicine exactly as directed. If you have ever had tuberculosis or hepatitis B, golimumab can cause these conditions to come back or get worse. You should be tested for these conditions before you start using golimumab. Golimumab is sometimes given as an infusion into a vein. A healthcare provider will give you this type of injection once every 4 to 8 weeks. Golimumab may also be injected under the skin once every 2 to 4 weeks. A healthcare provider may teach you how to properly use the medication by yourself. Read and carefully follow any Instructions for Use provided with your medicine. Ask your doctor or pharmacist if you have questions. Prepare your injection only when you are ready to give it. Do not use if the medicine has changed colors or has particles in it. Call your pharmacist for new medicine. Your care provider will show you the best places on your body to inject golimumab. Do not inject into the same place two times in a row. Golimumab can weaken your immune system. Your blood will need to be tested often. Store the medication in its original carton in the refrigerator, do not freeze. Protect from light.   Take the medicine out of the refrigerator and let it reach room temperature for 30 minutes lupus --muscle or joint pain, and a skin rash on your cheeks or arms that worsens in sunlight; or  · signs of psoriasis --red or scaly patches of skin, flaking, pus. Common side effects may include:  · infections, cold or flu symptoms;  · abnormal liver function tests;  · high blood pressure;  · rash; or  · pain, itching, redness, or swelling where the medicine was injected. This is not a complete list of side effects and others may occur. Call your doctor for medical advice about side effects. You may report side effects to FDA at 7-990-FDA-2254. What other drugs will affect golimumab? Tell your doctor about all your other medicines, especially:  · anakinra;  · abatacept, etanercept; or  · adalimumab, certolizumab, infliximab, rituximab, or tocilizumab. This list is not complete. Other drugs may affect golimumab, including prescription and over-the-counter medicines, vitamins, and herbal products. Not all possible drug interactions are listed here. Where can I get more information? Your pharmacist can provide more information about golimumab. Remember, keep this and all other medicines out of the reach of children, never share your medicines with others, and use this medication only for the indication prescribed. Every effort has been made to ensure that the information provided by [address] is accurate, up-to-date, and complete, but no guarantee is made to that effect. Drug information contained herein may be time sensitive. [facility] information has been compiled for use by healthcare practitioners and consumers in the [facility] and therefore [facility] does not warrant that uses outside of the [facility] are appropriate, unless specifically indicated otherwise. [facility]'s drug information does not endorse drugs, diagnose patients or recommend therapy.  [facility] drug information is an informational resource designed to assist licensed healthcare practitioners in caring for their front and back of your new card.  Infusion appointments will be placed on HOLD until a new Verification of Benefits and/or Prior Authorization (if required) is acquired   a. A NEW VOB (verification of benefits) must be requested to verify coverage for the medication and infusion fees. b. If a Prior Authorization is needed, we will obtain this and provide information needed to your insurance. c. This process can take 2-3 weeks depending on insurance.  Once the new VOB has been obtained the Infusion RN or a member of the Rheumatology staff will contact you to discuss changes in coverage. A new infusion schedule can be made at this time.  Changes in insurance may disqualify you from receiving infusion coverage, however, we will do everything we can to get your treatment, as we know it is necessary for your disease and quality of living. *As a reminder, anyone on Patient Copay Assistance (Commercial insurance ONLY. This does NOT pertain to Medicare or Medicaid). You should have received information from the drug  regarding how the copay programs work. If you have not received the information, please let us know. You are responsible for sending in the Explanation of Benefits (EOBs) and [employer] for dates of service to your individual Savings Program and then following thru to ensure it was approved. If you are having difficulty understanding the program, please reach out immediately for assistance. If these are not sent in a timely manner, you will be responsible for the large portion that your insurance doesn't cover, which can be in the thousands! These charges can add up quickly and we don't want you to be stuck with large bills or your treatment be stopped for non-payment of the medication. *Reminder Appointments are schedule one appointment ahead if you come every 6-8 weeks, and 2 ahead if you come every 4 weeks.  Please know that we cannot infuse if the physician is not present, so if we cannot accommodate your needs, please feel free to ask what other options are available. The nurse will do the best to accommodate your needs , there may be times that this is not always possible. Please be patient as we try to accommodate patients in a safe, and timely manner. If the physician is out, be aware that you may need to go to our [facility] to have your treatment in a timely manner. If you have questions, please speak with a member of the Rheumatology staff. We are here to help and guide you through this infusion process and ensure that you get the treatment that you need. Thank you for letting us take care of you! Thank you for your cooperation and help! We are grateful to serve you! You are receiving this because you are enrolled in a Copay Savings Program offered by the  for your infusion. This helps significantly with the cost of your medication! Please be aware:   You should have received information for the Copay Savings Program in the mail from the . Please don't throw this away! It has important information, such as phone numbers and what you need to do. It will provide easy, step by step instructions, along with a number to call if you are confused.  The Copay Savings Program covers a large portion of the medication ONLY. This typically brings the cost of the medication down to only $5 per infusion.  You and your insurance are responsible for any administration costs.  You are responsible for turning in all of your Explanation of Benefits (EOBs) to the Copay Savings Program.  These come from your insurance carrier. Please contact your program for any additional information. You may also need to contact your insurance carrier for a detailed EOB.  Please do not turn your EOBs into us, as this creates a delay.   You can mail, fax or upload the information into your program's portal.     Depending on your program, your funds will either be loaded onto a card or mailed by check to us on your behalf. This will then be applied to your balance when you call or bring in your card or we receive the check.  It is important to understand that if you do not turn the information into the Copay Savings Program in a timely manner, you may be responsible for the cost of the drug. We definitely want to avoid this, as these medications can be very costly and may result in a delay in your treatment.  Please reach out if you have any questions! Below are some helpful numbers for Copay Support Programs. Phil Cameron ([facility]): [phone] Suresh Curiel go to L[employer]. Pema Gouge. com. For [employer] Program go to [employer] [phone]    *A detailed EOB includes carrier name and logo, name of the plan, patient's responsibility, date of service and drug code broken out by name, J-code or National Drug Code ([address]).

## 2022-02-01 RX ORDER — METHOTREXATE 2.5 MG/1
TABLET ORAL
Qty: 104 TABLET | Refills: 0 | Status: SHIPPED | OUTPATIENT
Start: 2022-02-01 | End: 2022-02-14 | Stop reason: SDDI

## 2022-02-09 ENCOUNTER — TELEPHONE (OUTPATIENT)
Dept: INFUSION THERAPY | Age: 52
End: 2022-02-09

## 2022-02-09 NOTE — LETTER
[facility] Rheumatology Infusion  [address]  [address]  Phone: [phone]  Fax: [phone]    Siri Bower MD        February 9, 2022     [address] [place] [address]      Dear [facility]: We are sorry that you missed your appointment for Simponi Aria infusion on 2/9/2022. Your health and follow-up medical care are important to us. Please call our office as soon as possible so that we may reschedule your appointment. If you have already rescheduled your appointment, please disregard this letter.     Sincerely,        Siri Bower MD

## 2022-02-10 ENCOUNTER — TELEPHONE (OUTPATIENT)
Dept: INFUSION THERAPY | Age: 52
End: 2022-02-10

## 2022-02-10 ENCOUNTER — NURSE ONLY (OUTPATIENT)
Dept: INFUSION THERAPY | Age: 52
End: 2022-02-10

## 2022-02-10 VITALS — TEMPERATURE: 98 F | HEART RATE: 88 BPM

## 2022-02-10 RX ORDER — SODIUM CHLORIDE 9 MG/ML
25 INJECTION, SOLUTION INTRAVENOUS PRN
Status: CANCELLED | OUTPATIENT
Start: 2022-02-24

## 2022-02-10 RX ORDER — SODIUM CHLORIDE 0.9 % (FLUSH) 0.9 %
5-40 SYRINGE (ML) INJECTION PRN
Status: CANCELLED | OUTPATIENT
Start: 2022-02-24

## 2022-02-10 RX ORDER — SODIUM CHLORIDE 9 MG/ML
INJECTION, SOLUTION INTRAVENOUS ONCE
Status: CANCELLED | OUTPATIENT
Start: 2022-02-24 | End: 2022-02-24

## 2022-02-10 RX ORDER — DIPHENHYDRAMINE HYDROCHLORIDE 50 MG/ML
50 INJECTION INTRAMUSCULAR; INTRAVENOUS ONCE
Status: CANCELLED | OUTPATIENT
Start: 2022-02-24 | End: 2022-02-24

## 2022-02-10 RX ORDER — METHYLPREDNISOLONE SODIUM SUCCINATE 125 MG/2ML
125 INJECTION, POWDER, LYOPHILIZED, FOR SOLUTION INTRAMUSCULAR; INTRAVENOUS ONCE
Status: CANCELLED | OUTPATIENT
Start: 2022-02-24 | End: 2022-02-24

## 2022-02-10 RX ORDER — EPINEPHRINE 1 MG/ML
0.3 INJECTION, SOLUTION, CONCENTRATE INTRAVENOUS PRN
Status: CANCELLED | OUTPATIENT
Start: 2022-02-24

## 2022-02-10 RX ORDER — HEPARIN SODIUM (PORCINE) LOCK FLUSH IV SOLN 100 UNIT/ML 100 UNIT/ML
500 SOLUTION INTRAVENOUS PRN
Status: CANCELLED | OUTPATIENT
Start: 2022-02-24

## 2022-02-10 RX ORDER — SODIUM CHLORIDE 9 MG/ML
INJECTION, SOLUTION INTRAVENOUS CONTINUOUS
Status: CANCELLED | OUTPATIENT
Start: 2022-02-24

## 2022-02-10 NOTE — PROGRESS NOTES
Arrived today with new insurance card. Treatment held until [facility] verification process completed.

## 2022-02-11 NOTE — TELEPHONE ENCOUNTER
VOB and copay assistance approval received for Simponi Aria. Please obtain PA for Simponi Aria ()     Dose: 2mg/kg Q 8 weeks    Infusion code: 94914 and 30755    Dx: M05.79    Quantiferon: May 24, 2021    Insurance: Vannessa Bain scanned into media    will also have a 20% copay for admin fee due at time of service.

## 2022-02-11 NOTE — TELEPHONE ENCOUNTER
VOB:    The insurance will pay 80 %. The patient is responsible for 20% that will apply towards her deductible   ( 1500 / 1,445 remaining). Once OOP max (3,000 / 2936.00 remaining) met the insurance will cover 100%. All codes valid and billable and ok for [address] [place], [place] [address]y REQUIRED    01054- NO PA REQUIRED    93134- NO PA REQUIRED      REF # [id number]

## 2022-02-11 NOTE — TELEPHONE ENCOUNTER
Submitted PA for [facility] FRANCISCO, via Phone Call to [facility].  Reference # [id number] Status: PENDING REVIEW     CLINICALS FAXED

## 2022-02-22 ENCOUNTER — NURSE ONLY (OUTPATIENT)
Dept: INFUSION THERAPY | Age: 52
End: 2022-02-22
Payer: COMMERCIAL

## 2022-02-22 ENCOUNTER — OFFICE VISIT (OUTPATIENT)
Dept: RHEUMATOLOGY | Age: 52
End: 2022-02-22
Payer: COMMERCIAL

## 2022-02-22 VITALS — BODY MASS INDEX: 28.94 KG/M2 | WEIGHT: 158.2 LBS

## 2022-02-22 DIAGNOSIS — Z79.899 HIGH RISK MEDICATION USE: ICD-10-CM

## 2022-02-22 DIAGNOSIS — M05.79 RHEUMATOID ARTHRITIS INVOLVING MULTIPLE SITES WITH POSITIVE RHEUMATOID FACTOR (HCC): Primary | ICD-10-CM

## 2022-02-22 LAB
ALT SERPL-CCNC: 15 U/L (ref 10–40)
AST SERPL-CCNC: 17 U/L (ref 15–37)
BASOPHILS ABSOLUTE: 0 K/UL (ref 0–0.2)
BASOPHILS RELATIVE PERCENT: 0.4 %
C-REACTIVE PROTEIN: 14.2 MG/L (ref 0–5.1)
CREAT SERPL-MCNC: 0.9 MG/DL (ref 0.6–1.1)
EOSINOPHILS ABSOLUTE: 0.1 K/UL (ref 0–0.6)
EOSINOPHILS RELATIVE PERCENT: 2.3 %
GFR AFRICAN AMERICAN: >60
GFR NON-AFRICAN AMERICAN: >60
HCT VFR BLD CALC: 38.2 % (ref 36–48)
HEMOGLOBIN: 12.8 G/DL (ref 12–16)
LYMPHOCYTES ABSOLUTE: 1.6 K/UL (ref 1–5.1)
LYMPHOCYTES RELATIVE PERCENT: 25.4 %
MCH RBC QN AUTO: 33.9 PG (ref 26–34)
MCHC RBC AUTO-ENTMCNC: 33.6 G/DL (ref 31–36)
MCV RBC AUTO: 100.7 FL (ref 80–100)
MONOCYTES ABSOLUTE: 0.8 K/UL (ref 0–1.3)
MONOCYTES RELATIVE PERCENT: 12.6 %
NEUTROPHILS ABSOLUTE: 3.7 K/UL (ref 1.7–7.7)
NEUTROPHILS RELATIVE PERCENT: 59.3 %
PDW BLD-RTO: 16.6 % (ref 12.4–15.4)
PLATELET # BLD: 271 K/UL (ref 135–450)
PMV BLD AUTO: 8.8 FL (ref 5–10.5)
RBC # BLD: 3.79 M/UL (ref 4–5.2)
SEDIMENTATION RATE, ERYTHROCYTE: 24 MM/HR (ref 0–30)
WBC # BLD: 6.3 K/UL (ref 4–11)

## 2022-02-22 PROCEDURE — 99214 OFFICE O/P EST MOD 30 MIN: CPT | Performed by: INTERNAL MEDICINE

## 2022-02-22 PROCEDURE — 36415 COLL VENOUS BLD VENIPUNCTURE: CPT | Performed by: INTERNAL MEDICINE

## 2022-02-22 PROCEDURE — 96413 CHEMO IV INFUSION 1 HR: CPT | Performed by: INTERNAL MEDICINE

## 2022-02-22 RX ORDER — SODIUM CHLORIDE 9 MG/ML
INJECTION, SOLUTION INTRAVENOUS ONCE
Status: CANCELLED | OUTPATIENT
Start: 2022-03-22 | End: 2022-03-22

## 2022-02-22 RX ORDER — EPINEPHRINE 1 MG/ML
0.3 INJECTION, SOLUTION, CONCENTRATE INTRAVENOUS PRN
Status: CANCELLED | OUTPATIENT
Start: 2022-03-22

## 2022-02-22 RX ORDER — SODIUM CHLORIDE 9 MG/ML
INJECTION, SOLUTION INTRAVENOUS CONTINUOUS
Status: CANCELLED | OUTPATIENT
Start: 2022-03-22

## 2022-02-22 RX ORDER — SODIUM CHLORIDE 9 MG/ML
25 INJECTION, SOLUTION INTRAVENOUS PRN
Status: CANCELLED | OUTPATIENT
Start: 2022-03-22

## 2022-02-22 RX ORDER — SODIUM CHLORIDE 0.9 % (FLUSH) 0.9 %
5-40 SYRINGE (ML) INJECTION PRN
Status: CANCELLED | OUTPATIENT
Start: 2022-03-22

## 2022-02-22 RX ORDER — HEPARIN SODIUM (PORCINE) LOCK FLUSH IV SOLN 100 UNIT/ML 100 UNIT/ML
500 SOLUTION INTRAVENOUS PRN
Status: CANCELLED | OUTPATIENT
Start: 2022-03-22

## 2022-02-22 RX ORDER — METHYLPREDNISOLONE SODIUM SUCCINATE 125 MG/2ML
125 INJECTION, POWDER, LYOPHILIZED, FOR SOLUTION INTRAMUSCULAR; INTRAVENOUS ONCE
Status: CANCELLED | OUTPATIENT
Start: 2022-03-22 | End: 2022-03-22

## 2022-02-22 RX ORDER — DIPHENHYDRAMINE HYDROCHLORIDE 50 MG/ML
50 INJECTION INTRAMUSCULAR; INTRAVENOUS ONCE
Status: CANCELLED | OUTPATIENT
Start: 2022-03-22 | End: 2022-03-22

## 2022-02-22 NOTE — PROGRESS NOTES
[facility], [place]                                                                                                                          (W) [phone] (F)      Dear Dr. Abhi Vyas:  Please find Rheumatology assessment. Thank you for giving me the opportunity to be involved in [facility] Enrique's care and I look forward following [facility] along with you. If you have any questions or concerns please feel free to reach me. Note is transcribed using voice recognition software. Inadvertent computerized transcription errors may be present. Patient identification: Julito Nunez,: 1970,51 y.o. Sex: female     Assessment / Plan:  [facility] was seen today for follow-up. Diagnoses and all orders for this visit:    Rheumatoid arthritis involving multiple sites with positive rheumatoid factor ([facility]    High risk medication use    BMI 28.0-28.9,adult      Seropositive erosive RA-  Official diagnosis- 2018- symptoms for a much longer duration. Doing much better on Simponi Aria infusion and methotrexate 20 mg weekly. Off of prednisone. History of positive PPD in past, took [address] [employer] for 6-9 months-approximately approximately , no history of any active tuberculosis. TB QuantiFERON test -negative from-2019    Plan-  Safety lab will be checked today. Continue current regimen including methotrexate 20 mg weekly and Simponi Aria every 8 weekly. Discussed about BMI of 28, and the benefit of optimal weight and health, specially to prevent the progression of osteoarthritis and other health conditions. Various measures were discussed including calorie count, portion control and increasing physical activities.   She was not able to do much in the past because of swollen knee, plans to exercise now she is feeling better. RTC 2 months. Patient indicates understanding and agrees with the management plan. I reviewed patient's history, referral documents and electronic medical records. .  #######################################################################    Jvrehucdqr-ikcmqc-lz for seropositive rheumatoid arthritis. Interval changes-  She is doing well, denies any pain swelling stiffness in the joints. Predominantly affected joints were bilateral knees, had recurrent inflammatory effusion. Pleased on current regimen. Is here for  Simponi Aria infusion today. Continues to take methotrexate. ADLs and recreational activities are normal.    Past Medical History:   Diagnosis Date    Depression     High risk medication use 2/22/2022     No past surgical history on file. FH- P Aunt - RA. Current Outpatient Medications   Medication Sig Dispense Refill    Multiple Vitamins-Minerals (THERAPEUTIC MULTIVITAMIN-MINERALS) tablet Take 1 tablet by mouth daily      methotrexate (RHEUMATREX) 2.5 MG chemo tablet Take 5 Tabs po once a week, same day every week 66 tablet 0    folic acid (FOLVITE) 1 MG tablet Take 1 tab po daily. 90 tablet 3    Zolpidem Tartrate (AMBIEN PO) Take 12 mg by mouth nightly as needed       FLUoxetine (PROZAC) 10 MG capsule Take 10 mg by mouth daily       No current facility-administered medications for this visit. No Known Allergies    PHYSICAL EXAM:    Vitals: There were no vitals taken for this visit. General appearance/ Psychiatric: well nourished, and well groomed, normal judgement, alert, appears stated age and cooperative.    MKS:   28 joint JOINT COUNT:                               Right                                                  Left   Swell Tender Swell Tender   PIP1           PIP2         PIP3       PIP4       PIP5          MCP1           MCP2       MCP3       MCP4           MCP5           Wrist        Elbow           Shoulder          Knee No appreciable tender swollen inflamed joints in upper or lower extremities, F ROM in all peripheral joints. Skin: No rashes, no induration or skin thickening or nodules. No evidence ischemia or deformities noted in digits or nails. DATA:   Lab Results   Component Value Date    WBC 6.9 11/24/2021    HGB 11.7 (L) 11/24/2021    HCT 35.0 (L) 11/24/2021    MCV 97.0 11/24/2021     11/24/2021         Chemistry        Component Value Date/Time     11/24/2021 1050    K 4.5 11/24/2021 1050     11/24/2021 1050    CO2 19 (L) 11/24/2021 1050    BUN 8 11/24/2021 1050    CREATININE 0.7 11/24/2021 1050        Component Value Date/Time    CALCIUM 9.2 11/24/2021 1050    ALKPHOS 150 (H) 11/24/2021 1050    AST 14 (L) 11/24/2021 1050    ALT 13 11/24/2021 1050    BILITOT <0.2 11/24/2021 1050          Lab Results   Component Value Date    LABURIC 5.9 04/12/2018     Lab Results   Component Value Date    SEDRATE 62 (H) 11/24/2021     Lab Results   Component Value Date    CRP 59.4 (H) 11/24/2021          Radiology Review:   Left wrist X rays- joint space narrowing of carpal joints, erosions in carpal bones. A/P- See above. Total time 32 minutes that includes the following-  Preparing to see the patient such as reviewing patients records, pre-charting, preparing the visit on the same day, performing a medically appropriate history and physical examination, counseling and educating patient about diagnosis, management plan, ordering appropriate testings, prescriptions, communicating findings to other care providers, and documenting clinical information in electronic medical record.

## 2022-02-22 NOTE — PATIENT INSTRUCTIONS
golimumab  Pronunciation:  angie JOHNSON ue mab  Brand:  Zac Frank  What is the most important information I should know about golimumab? You may get infections more easily, even serious or fatal infections. Call your doctor if you have a fever, chills, aches, tiredness, cough, skin sores, diarrhea, or burning when you urinate. If you have ever had tuberculosis or hepatitis B, golimumab can cause these conditions to come back or get worse. You should be tested for these conditions before you start using golimumab. What is golimumab? Golimumab is used to treat rheumatoid arthritis and ankylosing spondylitis in adults. Golimumab is also used to treat psoriatic arthritis and polyarticular juvenile idiopathic arthritis in adults and children at least 3years old. Golimumab is also used to treat ulcerative colitis (UC) in adults when other medicines have not worked or could not be tolerated. Golimumab is sometimes given with methotrexate or a steroid medicine. Golimumab may also be used for purposes not listed in this medication guide. What should I discuss with my healthcare provider before using golimumab? You should not use golimumab if you are allergic to it. Tell your doctor if you have ever had:  · active or chronic infections;  · a nerve-muscle disease such as multiple sclerosis or Guillain-Barré syndrome;  · cancer or lymphoma;  · congestive heart failure;  · psoriasis; or  · if you are scheduled to receive any vaccine. Tell your doctor if you have ever had tuberculosis or if anyone in your household has tuberculosis. Also tell your doctor if you have recently traveled. Tuberculosis and some fungal infections are more common in certain parts of the world, and you may have been exposed during travel. Using golimumab may increase your risk of developing autoimmune disorders or cancer, including skin cancer. Talk to your doctor about this risk and what symptoms to watch for.   Golimumab may cause a rare type of lymphoma (cancer) of the liver, spleen, and bone marrow that can be fatal. This has occurred mainly in teenagers and young men with Crohn's disease or ulcerative colitis. Anyone with an inflammatory autoimmune disorder may have a higher risk of lymphoma. Talk with your doctor about your own risk. Tell your doctor if you are pregnant or plan to become pregnant. Your baby could have an increased risk of infection for up to 6 months if you use golimumab during pregnancy. If you use golimumab during pregnancy, your  baby should not receive a live vaccine for at least 6 months after you last received a dose of golimumab. You should not breastfeed while using this medicine. How should I use golimumab? Follow all directions on your prescription label and read all medication guides or instruction sheets. Use the medicine exactly as directed. If you have ever had tuberculosis or hepatitis B, golimumab can cause these conditions to come back or get worse. You should be tested for these conditions before you start using golimumab. Golimumab is sometimes given as an infusion into a vein. A healthcare provider will give you this type of injection once every 4 to 8 weeks. Golimumab may also be injected under the skin once every 2 to 4 weeks. A healthcare provider may teach you how to properly use the medication by yourself. Read and carefully follow any Instructions for Use provided with your medicine. Ask your doctor or pharmacist if you have questions. Prepare your injection only when you are ready to give it. Do not use if the medicine has changed colors or has particles in it. Call your pharmacist for new medicine. Your care provider will show you the best places on your body to inject golimumab. Do not inject into the same place two times in a row. Golimumab can weaken your immune system. Your blood will need to be tested often.   Store the medication in its original carton in the refrigerator, do not freeze. Protect from light. Take the medicine out of the refrigerator and let it reach room temperature for 30 minutes before injecting your dose. Do not warm the medication with water or heat. Each single-use prefilled syringe or auto-injector device is for one use only. Throw it away after one use, even if there is still medicine left inside. Use a needle and syringe only once and then place them in a puncture-proof \"sharps\" container. Follow state or local laws about how to dispose of this container. Keep it out of the reach of children and pets. What happens if I miss a dose? Call your doctor for instructions if you miss a dose. What happens if I overdose? Seek emergency medical attention or call the Poison Help line at 1-136.134.5741. What should I avoid while using golimumab? Do not receive a \"live\" vaccine while using golimumab. Live vaccines include measles, mumps, rubella (MMR), rotavirus, typhoid, yellow fever, varicella (chickenpox), zoster (shingles), and nasal flu (influenza) vaccine. What are the possible side effects of golimumab? Get emergency medical help if you have signs of an allergic reaction: hives, itching; nausea; chest pain, difficulty breathing; swelling of your face, lips, tongue, or throat. You may get infections more easily, even serious or fatal infections. Call your doctor right away if you have signs of infection such as:  · fever, chills, night sweats, muscle aches, feeling very tired;  · cough, bloody mucus, shortness of breath;  · weight loss;  · skin sores with pain, warmth, or redness;  · diarrhea, stomach pain; or  · increased urination, or burning when you urinate.   Also call your doctor at once if you have:  · skin growths or changes in skin appearance;  · swelling in your lower legs;  · vision changes;  · numbness or tingly feeling, weakness in your arms or legs;  · pale skin, easy bruising or bleeding;  · liver problems --right-sided upper therapy. [facility]'s drug information is an informational resource designed to assist licensed healthcare practitioners in caring for their patients and/or to serve consumers viewing this service as a supplement to, and not a substitute for, the expertise, skill, knowledge and judgment of healthcare practitioners. The absence of a warning for a given drug or drug combination in no way should be construed to indicate that the drug or drug combination is safe, effective or appropriate for any given patient. [facility] does not assume any responsibility for any aspect of healthcare administered with the aid of information [facility] provides. The information contained herein is not intended to cover all possible uses, directions, precautions, warnings, drug interactions, allergic reactions, or adverse effects. If you have questions about the drugs you are taking, check with your doctor, nurse or pharmacist.  Copyright 6497-3954 [facility] [place]. Version: 7.02. Revision date: 4/16/2021. Care instructions adapted under license by [facility] ([facility]). If you have questions about a medical condition or this instruction, always ask your healthcare professional. [place] [address] any warranty or liability for your use of this information. To avoid any disruptions in your treatment, we ask that you please notify us immediately if you have ANY insurance changes such as a new plan, new plan number, new group number, or if you might be losing coverage. If the infusion nurse does not receive your new insurance information, your infusion may be temporarily held until we can check your benefits and get an authorization from your new insurance. Please notify us BEFORE your next infusion, as it may take several weeks to check your benefits and for your new insurance to approve your continuing treatment. Please know that we want to help you in anyway we can, so notify us early!    If your insurance coverage changes in ANY way you must do the follow:   Contact our office and speak with the Infusion Nurse. All calls will be returned within 48 hours, except on weekends. We will need a copy of both the front and back of your new card.  Infusion appointments will be placed on HOLD until a new Verification of Benefits and/or Prior Authorization (if required) is acquired   a. A NEW VOB (verification of benefits) must be requested to verify coverage for the medication and infusion fees. b. If a Prior Authorization is needed, we will obtain this and provide information needed to your insurance. c. This process can take 3-4 weeks depending on insurance.  Once the new VOB has been obtained the Infusion RN or a member of the Rheumatology staff will contact you to discuss changes in coverage. A new infusion schedule can be made at this time.  Changes in insurance may disqualify you from receiving infusion coverage, however, we will do everything we can to get your treatment, as we know it is necessary for your disease and quality of living.       *Co-payment and deductible is due at time of Service. *As a reminder, anyone on Patient Copay Assistance (Commercial insurance ONLY. This does NOT pertain to Medicare or Medicaid). You should have received information from the drug  regarding how the copay programs work. If you have not received the information, please let us know. You are responsible for sending in the Explanation of Benefits (EOBs) and [employer] for dates of service to your individual Savings Program and then following thru to ensure it was approved. If you are having difficulty understanding the program, please reach out immediately for assistance. If these are not sent in a timely manner, you will be responsible for the large portion that your insurance doesn't cover, which can be in the thousands!   These charges can add up quickly and we don't want you to be stuck with large bills or your treatment be stopped for non-payment of the medication. *Reminder Appointments are schedule one appointment ahead if you come every 6-8 weeks, and 2 ahead if you come every 4 weeks. Please know that we cannot infuse if the physician is not present, so if we cannot accommodate your needs, please feel free to ask what other options are available. The nurse will do the best to accommodate your needs , there may be times that this is not always possible. Please be patient as we try to accommodate patients in a safe, and timely manner. If the physician is out, be aware that you may need to go to our [address], located on campus of [facility], to have your treatment in a timely manner. If you have questions, please speak with a member of the Rheumatology staff. We are here to help and guide you through this infusion process and ensure that you get the treatment that you need. Thank you for letting us take care of you! Thank you for your cooperation and help! We are grateful to serve you! You are receiving this because you are enrolled in a Copay Savings Program offered by the  for your infusion. This helps significantly with the cost of your medication! Please be aware:   You should have received information for the Copay Savings Program in the mail from the . Please don't throw this away! It has important information, such as phone numbers and what you need to do. It will provide easy, step by step instructions, along with a number to call if you are confused.  The Copay Savings Program covers a large portion of the medication ONLY. This typically brings the cost of the medication down to only $5 per infusion.  You and your insurance are responsible for any administration costs.  You are responsible for turning in all of your Explanation of Benefits (EOBs) to the Copay Savings Program.  These come from your insurance carrier.   Please contact your program for any additional information. You may also need to contact your insurance carrier for a detailed EOB.  Please do not turn your EOBs into us, as this creates a delay. You can mail, fax or upload the information into your program's portal.     Depending on your program, your funds will either be loaded onto a card or mailed by check to us on your behalf. This will then be applied to your balance when you call or bring in your card or we receive the check.  It is important to understand that if you do not turn the information into the Copay Savings Program in a timely manner, you may be responsible for the cost of the drug. We definitely want to avoid this, as these medications can be very costly and may result in a delay in your treatment.  Please reach out if you have any questions! Below are some helpful numbers for Copay Support Programs. Emy Echeverria (Inderjitradha Edgar): [phone]. [facility] go to [employer]. [place]. com. For [employer] Program go to [employer] [phone]    *A detailed EOB includes carrier name and logo, name of the plan, patient's responsibility, date of service and drug code broken out by name, J-code or National Drug Code ([address]). *Reminder Appointments are schedule one appointment ahead if you come every 6-8 weeks, and 2 ahead if you come every 4 weeks. Please know that we cannot infuse if the physician is not present. If we cannot accommodate your needs, please feel free to ask what other options are available. The nurse will do the best to accommodate your needs , there may be times that this is not always possible. Please be patient as we try to accommodate patients in a safe, and timely manner. If the physician is out, be aware that you may need to go to our [facility] to have your treatment in a timely manner.  If you have questions, please speak with a member of the Rheumatology staff. We are here to help and guide you through this infusion process and ensure that you get the treatment that you need.

## 2022-02-22 NOTE — PROGRESS NOTES
2/22/2022, Arrived at 1010 am for 1030 am appointment. Last infusion 12/15/2021, missed 2/9/2022 appointment, 2/10/2022 appointment arrived with new insurance. Pt here for Simponi Aria infusion #3, follow up visit with Dr. Villanueva Reason and labs today: cbc, creatinine, crp, AST, ALT, and sed rate drawn. No  Adverse effects from previous infusion. YOLEY564 lbs =71 kg   X 2 mg/kg = 143 mg, Rounded to 150 mg per MD.  Infusion  tolerated well. Next visit scheduled  in 8 weeks.     IV Gauge: #22  IV Site: right antecubital  # of Attempts: 1  IV Start: 1030 am  IV Stop: 1100 am          See Therapy Plan, flowsheets and MAR  Administrations This Visit     golimumab 143.75 mg in sodium chloride 0.9 % 100 mL infusion     Admin Date  02/22/2022  10:30 Action  New Bag Dose  150 mg Rate  200 mL/hr Route  IntraVENous Site   Administered By  Clara Leon RN    Ordering Provider: Jose Angel Mancini MD    Patient Supplied?: No

## 2022-02-28 RX ORDER — METHOTREXATE 2.5 MG/1
TABLET ORAL
Qty: 104 TABLET | Refills: 0 | Status: SHIPPED | OUTPATIENT
Start: 2022-02-28 | End: 2022-06-02

## 2022-04-20 ENCOUNTER — NURSE ONLY (OUTPATIENT)
Dept: INFUSION THERAPY | Age: 52
End: 2022-04-20
Payer: COMMERCIAL

## 2022-04-20 ENCOUNTER — OFFICE VISIT (OUTPATIENT)
Dept: RHEUMATOLOGY | Age: 52
End: 2022-04-20
Payer: COMMERCIAL

## 2022-04-20 VITALS
HEART RATE: 88 BPM | TEMPERATURE: 98.4 F | DIASTOLIC BLOOD PRESSURE: 70 MMHG | WEIGHT: 155 LBS | RESPIRATION RATE: 16 BRPM | SYSTOLIC BLOOD PRESSURE: 126 MMHG | BODY MASS INDEX: 28.35 KG/M2

## 2022-04-20 DIAGNOSIS — Z79.899 HIGH RISK MEDICATION USE: ICD-10-CM

## 2022-04-20 DIAGNOSIS — M05.79 RHEUMATOID ARTHRITIS INVOLVING MULTIPLE SITES WITH POSITIVE RHEUMATOID FACTOR (HCC): Primary | ICD-10-CM

## 2022-04-20 LAB
ALT SERPL-CCNC: 12 U/L (ref 10–40)
AST SERPL-CCNC: 13 U/L (ref 15–37)
BASOPHILS ABSOLUTE: 0 K/UL (ref 0–0.2)
BASOPHILS RELATIVE PERCENT: 0.2 %
C-REACTIVE PROTEIN: 6.9 MG/L (ref 0–5.1)
CREAT SERPL-MCNC: 0.7 MG/DL (ref 0.6–1.1)
EOSINOPHILS ABSOLUTE: 0.1 K/UL (ref 0–0.6)
EOSINOPHILS RELATIVE PERCENT: 2.1 %
GFR AFRICAN AMERICAN: >60
GFR NON-AFRICAN AMERICAN: >60
HCT VFR BLD CALC: 38.8 % (ref 36–48)
HEMOGLOBIN: 12.7 G/DL (ref 12–16)
LYMPHOCYTES ABSOLUTE: 1.3 K/UL (ref 1–5.1)
LYMPHOCYTES RELATIVE PERCENT: 30.5 %
MCH RBC QN AUTO: 34.1 PG (ref 26–34)
MCHC RBC AUTO-ENTMCNC: 32.8 G/DL (ref 31–36)
MCV RBC AUTO: 103.9 FL (ref 80–100)
MONOCYTES ABSOLUTE: 0.4 K/UL (ref 0–1.3)
MONOCYTES RELATIVE PERCENT: 10.3 %
NEUTROPHILS ABSOLUTE: 2.5 K/UL (ref 1.7–7.7)
NEUTROPHILS RELATIVE PERCENT: 56.9 %
PDW BLD-RTO: 14.6 % (ref 12.4–15.4)
PLATELET # BLD: 244 K/UL (ref 135–450)
PMV BLD AUTO: 9.1 FL (ref 5–10.5)
RBC # BLD: 3.73 M/UL (ref 4–5.2)
SEDIMENTATION RATE, ERYTHROCYTE: 13 MM/HR (ref 0–30)
WBC # BLD: 4.3 K/UL (ref 4–11)

## 2022-04-20 PROCEDURE — 36415 COLL VENOUS BLD VENIPUNCTURE: CPT | Performed by: INTERNAL MEDICINE

## 2022-04-20 PROCEDURE — 96413 CHEMO IV INFUSION 1 HR: CPT | Performed by: INTERNAL MEDICINE

## 2022-04-20 PROCEDURE — 99214 OFFICE O/P EST MOD 30 MIN: CPT | Performed by: INTERNAL MEDICINE

## 2022-04-20 RX ORDER — SODIUM CHLORIDE 9 MG/ML
INJECTION, SOLUTION INTRAVENOUS ONCE
Status: CANCELLED | OUTPATIENT
Start: 2022-05-18 | End: 2022-05-18

## 2022-04-20 RX ORDER — SODIUM CHLORIDE 9 MG/ML
INJECTION, SOLUTION INTRAVENOUS CONTINUOUS
Status: CANCELLED | OUTPATIENT
Start: 2022-05-18

## 2022-04-20 RX ORDER — SODIUM CHLORIDE 0.9 % (FLUSH) 0.9 %
5-40 SYRINGE (ML) INJECTION PRN
Status: CANCELLED | OUTPATIENT
Start: 2022-05-18

## 2022-04-20 RX ORDER — METHYLPREDNISOLONE SODIUM SUCCINATE 125 MG/2ML
125 INJECTION, POWDER, LYOPHILIZED, FOR SOLUTION INTRAMUSCULAR; INTRAVENOUS ONCE
Status: CANCELLED | OUTPATIENT
Start: 2022-05-18 | End: 2022-05-18

## 2022-04-20 RX ORDER — HEPARIN SODIUM (PORCINE) LOCK FLUSH IV SOLN 100 UNIT/ML 100 UNIT/ML
500 SOLUTION INTRAVENOUS PRN
Status: CANCELLED | OUTPATIENT
Start: 2022-05-18

## 2022-04-20 RX ORDER — FAMOTIDINE 10 MG/ML
20 INJECTION, SOLUTION INTRAVENOUS ONCE
Status: CANCELLED | OUTPATIENT
Start: 2022-05-18 | End: 2022-05-18

## 2022-04-20 RX ORDER — DIPHENHYDRAMINE HYDROCHLORIDE 50 MG/ML
50 INJECTION INTRAMUSCULAR; INTRAVENOUS ONCE
Status: CANCELLED | OUTPATIENT
Start: 2022-05-18 | End: 2022-05-18

## 2022-04-20 RX ORDER — SODIUM CHLORIDE 9 MG/ML
25 INJECTION, SOLUTION INTRAVENOUS PRN
Status: CANCELLED | OUTPATIENT
Start: 2022-05-18

## 2022-04-20 RX ORDER — EPINEPHRINE 1 MG/ML
0.3 INJECTION, SOLUTION, CONCENTRATE INTRAVENOUS PRN
Status: CANCELLED | OUTPATIENT
Start: 2022-05-18

## 2022-04-20 NOTE — PROGRESS NOTES
[address] [facility] MD                                                                                                                          (S) [phone] (F)      Dear Dr. Tobi Carter:  Please find Rheumatology assessment. Thank you for giving me the opportunity to be involved in Murrell Najjar Applegate's care and I look forward following Murrell Najjar along with you. If you have any questions or concerns please feel free to reach me. Note is transcribed using voice recognition software. Inadvertent computerized transcription errors may be present. Patient identification: Te Nunez,: 1970,51 y.o. Sex: female     Assessment / Plan:    Diagnoses and all orders for this visit:    Rheumatoid arthritis involving multiple sites with positive rheumatoid factor ([facility] [address]    High risk medication use    BMI 28.0-28.9,adult      Seropositive erosive RA-  Official diagnosis- 2018-asymptomatic on Simponi Aria IV therapy and methotrexate 20 mg weekly. History of positive PPD in past, took 124 Intela for 6-9 months-approximately approximately , no history of any active tuberculosis. TB QuantiFERON test -negative from-2019    Plan-  Safety lab will be checked today. Continue current medications. If she remains asymptomatic, will plan to scale back on methotrexate in the next visit. BMI 28-benefits of optimal weight and health and weight loss measures were discussed, key factor being calorie count and healthy eating practices. RTC 2 months. Patient indicates understanding and agrees with the management plan. I reviewed patient's history, referral documents and electronic medical records.   .  #######################################################################    Rgudnjmtgg-ohtcby-rl for or lower extremities, F ROM in all peripheral joints. Skin: No rashes, no induration or skin thickening or nodules. No evidence ischemia or deformities noted in digits or nails. DATA:   Lab Results   Component Value Date    WBC 6.3 02/22/2022    HGB 12.8 02/22/2022    HCT 38.2 02/22/2022    .7 (H) 02/22/2022     02/22/2022         Chemistry        Component Value Date/Time     11/24/2021 1050    K 4.5 11/24/2021 1050     11/24/2021 1050    CO2 19 (L) 11/24/2021 1050    BUN 8 11/24/2021 1050    CREATININE 0.9 02/22/2022 1030        Component Value Date/Time    CALCIUM 9.2 11/24/2021 1050    ALKPHOS 150 (H) 11/24/2021 1050    AST 17 02/22/2022 1030    ALT 15 02/22/2022 1030    BILITOT <0.2 11/24/2021 1050          Lab Results   Component Value Date    LABURIC 5.9 04/12/2018     Lab Results   Component Value Date    SEDRATE 24 02/22/2022     Lab Results   Component Value Date    CRP 14.2 (H) 02/22/2022          Radiology Review:   Left wrist X rays- joint space narrowing of carpal joints, erosions in carpal bones. A/P- See above. Total time 31 minutes that includes the following-  Preparing to see the patient such as reviewing patients records, pre-charting, preparing the visit on the same day, performing a medically appropriate history and physical examination, counseling and educating patient about diagnosis, management plan, ordering appropriate testings, prescriptions, communicating findings to other care providers, and documenting clinical information in electronic medical record.

## 2022-04-20 NOTE — PROGRESS NOTES
4/20/2022  Pt here for Simponi Aria infusion #4, follow up visit with Dr. Rupali Erazo and labs today: cbc, creatinine, crp, AST, ALT, and sed rate drawn. No  Adverse effects from previous infusion. Today 155 lbs =70 kg   X 2 mg/kg = 140 mg, Rounded to 150 mg per MD.  Infusion  tolerated well. Vitals stable. Next visit scheduled.   IV Gauge: #22 Saf T Intima  IV Site: right antecubital  # of Attempts: 1  Infusion Start time: 0940 am  Infusion Stop time: 1010 am    See Therapy Plan, flowsheets and MAR  Administrations This Visit     golimumab 140 mg in sodium chloride 0.9 % 100 mL IVPB     Admin Date  04/20/2022  09:40 Action  New Bag Dose  150 mg Rate  200 mL/hr Route  IntraVENous Site   Administered By  Wally Morrison RN    Ordering Provider: Myrna Ashraf MD    Patient Supplied?: No    Comments: 150 mg per Dr Rupali Erazo

## 2022-04-20 NOTE — PATIENT INSTRUCTIONS
golimumab  Pronunciation: angie JOHNSON ue mab  Brand: Zac Frank  What is the most important information I should know about golimumab? You may get infections more easily, even serious or fatal infections. Call your doctor if you have a fever, chills, aches, tiredness, cough, skinsores, diarrhea, or burning when you urinate. If you have ever had tuberculosis or hepatitis B, golimumab can cause these conditions to come back or get worse. You should be tested for these conditions before you start using golimumab. What is golimumab? Golimumab is used to treat rheumatoid arthritis and ankylosing spondylitis inadults. Golimumab is also used to treat psoriatic arthritis and polyarticular juvenileidiopathic arthritis in adults and children at least 3years old. Golimumab is also used to treat ulcerative colitis (UC) in adults when othermedicines have not worked or could not be tolerated. Golimumab is sometimes given with methotrexate or a steroid medicine. Golimumab may also be used for purposes not listed in this medication guide. What should I discuss with my healthcare provider before using golimumab? You should not use golimumab if you are allergic to it. Tell your doctor if you have ever had:   active or chronic infections;   a nerve-muscle disease such as multiple sclerosis or Guillain-Barré syndrome;   cancer or lymphoma;   congestive heart failure;   psoriasis; or   if you are scheduled to receive any vaccine. Tell your doctor if you have ever had tuberculosis or if anyone in your household has tuberculosis. Also tell your doctor if you have recently traveled. Tuberculosis and some fungal infections are more commonin certain parts of the world, and you may have been exposed during travel. Using golimumab may increase your risk of developing autoimmune disorders or cancer, including skin cancer. Talk to your doctor about this risk and what symptoms to watch for.   Golimumab may cause a rare type of lymphoma (cancer) of the liver, spleen, and bone marrow that can be fatal. This has occurred mainly in teenagers and young men with Crohn's disease or ulcerative colitis. Anyone with an inflammatory autoimmune disorder may have ahigher risk of lymphoma. Talk with your doctor about your own risk. Tell your doctor if you are pregnant or plan to become pregnant. Your baby could have an increased risk of infection for up to 6 months if you use golimumab during pregnancy. If you use golimumab during pregnancy, your  baby should not receive a live vaccine for at least 6 monthsafter you last received a dose of golimumab. You should not breastfeed while using this medicine. How should I use golimumab? Follow all directions on your prescription label and read all medication guidesor instruction sheets. Use the medicine exactly as directed. If you have ever had tuberculosis or hepatitis B, golimumab can cause these conditions to come back or get worse. You should be tested for these conditions before you start using golimumab. Golimumab is sometimes given as an infusion into a vein. A healthcare provider will give you this type of injection once every 4 to 8weeks. Golimumab may also be injected under the skin once every 2 to 4 weeks. A healthcare provider may teach you how to properlyuse the medication by yourself. Read and carefully follow any Instructions for Use provided with your medicine. Ask your doctor or pharmacist if you have questions. Prepare your injection only when you are ready to give it. Do not use if the medicine has changed colors or has particles in it. Call your pharmacist for new medicine. Your care provider will show you the best places on your body to injectgolimumab. Do not inject into the same place two times in a row. Golimumab can weaken your immune system. Your blood will need to be tested often.   Store the medication in its original carton in the refrigerator, do not freeze. Protect from light. Take the medicine out of the refrigerator and let it reach room temperature for 30 minutes before injecting your dose. Do not warm the medication with water orheat. Each single-use prefilled syringe or auto-injector device is for one use only. Throw it away after one use, even if there is still medicine left inside. Use a needle and syringe only once and then place them in a puncture-proof \"sharps\" container. Follow state or local laws about how to dispose of thiscontainer. Keep it out of the reach of children and pets. What happens if I miss a dose? Call your doctor for instructions if you miss a dose. What happens if I overdose? Seek emergency medical attention or call the Poison Help line at 1-472.548.7168. What should I avoid while using golimumab? Do not receive a \"live\" vaccine while using golimumab. Live vaccines include measles, mumps, rubella (MMR), rotavirus, typhoid, yellow fever, varicella (chickenpox), zoster (shingles), and nasal flu(influenza) vaccine. What are the possible side effects of golimumab? Get emergency medical help if you have signs of an allergic reaction: hives, itching; nausea; chest pain, difficulty breathing; swelling of yourface, lips, tongue, or throat. You may get infections more easily, even serious or fatal infections. Call your doctor right away if you have signs of infection such as:   fever, chills, night sweats, muscle aches, feeling very tired;   cough, bloody mucus, shortness of breath;   weight loss;   skin sores with pain, warmth, or redness;   diarrhea, stomach pain; or   increased urination, or burning when you urinate.   Also call your doctor at once if you have:   skin growths or changes in skin appearance;   swelling in your lower legs;   vision changes;   numbness or tingly feeling, weakness in your arms or legs;   pale skin, easy bruising or bleeding;   liver problems --right-sided upper stomach pain, loss of appetite, dark urine, reinaldo-colored stools, jaundice (yellowing of the skin or eyes);   new or worsening symptoms of lupus --muscle or joint pain, and a skin rash on your cheeks or arms that worsens in sunlight; or   signs of psoriasis --red or scaly patches of skin, flaking, pus. Common side effects may include:   infections, cold or flu symptoms;   abnormal liver function tests;   high blood pressure;   rash; or   pain, itching, redness, or swelling where the medicine was injected. This is not a complete list of side effects and others may occur. Call your doctor for medical advice about side effects. You may report side effects toFDA at [phone]-[phone]. What other drugs will affect golimumab? Tell your doctor about all your other medicines, especially:   anakinra;   abatacept, etanercept; or   adalimumab, certolizumab, infliximab, rituximab, or tocilizumab. This list is not complete. Other drugs may affect golimumab, including prescription and over-the-counter medicines, vitamins, and herbal products. Notall possible drug interactions are listed here. Where can I get more information? Your pharmacist can provide more information about golimumab. Remember, keep this and all other medicines out of the reach of children, never share your medicines with others, and use this medication only for the indication prescribed. Every effort has been made to ensure that the information provided by [address] is accurate, up-to-date, and complete, but no guarantee is made to that effect. Drug information contained herein may be time sensitive. [facility] information has been compiled for use by healthcare practitioners and consumers in the [place] and therefore [facility] does not warrant that uses outside of the [place] are appropriate, unless specifically indicated otherwise. [facility]'s drug information does not endorse drugs, diagnose patients or recommend therapy.  [employer]'s drug information is an informational resource designed to assist licensed healthcare practitioners in caring for their patients and/or to serve consumers viewing this service as a supplement to, and not a substitute for, the expertise, skill, knowledge and judgment of healthcare practitioners. The absence of a warning for a given drug or drug combination in no way should be construed to indicate that the drug or drug combination is safe, effective or appropriate for any given patient. [facility] does not assume any responsibility for any aspect of healthcare administered with the aid of information [facility] provides. The information contained herein is not intended to cover all possible uses, directions, precautions, warnings, drug interactions, allergic reactions, or adverse effects. If you have questions about the drugs you are taking, check with yourdoctor, nurse or pharmacist.  Copyright 2013-7538 [address] [place] [address] Dr AQUINO. Version: 7.02. Revision date: 4/16/2021. Care instructions adapted under license by [facility] ([facility]). If you have questions about a medical condition or this instruction, always ask your healthcare professional. [place] [address] any warranty or liability for your use of this information. To avoid any disruptions in your treatment, we ask that you please notify us immediately if you have ANY insurance changes such as a new plan, new plan number, new group number, or if you might be losing coverage. If the infusion nurse does not receive your new insurance information, your infusion may be temporarily held until we can check your benefits and get an authorization from your new insurance. Please notify us BEFORE your next infusion, as it may take several weeks to check your benefits and for your new insurance to approve your continuing treatment. Please know that we want to help you in anyway we can, so notify us early!    If your insurance coverage changes in ANY way you must do the follow:   Contact our office and speak with the Infusion Nurse. All calls will be returned within 48 hours, except on weekends. We will need a copy of both the front and back of your new card.  Infusion appointments will be placed on HOLD until a new Verification of Benefits and/or Prior Authorization (if required) is acquired   a. A NEW VOB (verification of benefits) must be requested to verify coverage for the medication and infusion fees. b. If a Prior Authorization is needed, we will obtain this and provide information needed to your insurance. c. This process can take 3-4 weeks depending on insurance.  Once the new VOB has been obtained the Infusion RN or a member of the Rheumatology staff will contact you to discuss changes in coverage. A new infusion schedule can be made at this time.  Changes in insurance may disqualify you from receiving infusion coverage, however, we will do everything we can to get your treatment, as we know it is necessary for your disease and quality of living.       *Co-payment and deductible is due at time of Service. *As a reminder, anyone on Patient Copay Assistance (Commercial insurance ONLY. This does NOT pertain to Medicare or Medicaid). You should have received information from the drug  regarding how the copay programs work. If you have not received the information, please let us know. You are responsible for sending in the Explanation of Benefits (EOBs) and [employer] for dates of service to your individual Savings Program and then following thru to ensure it was approved. If you are having difficulty understanding the program, please reach out immediately for assistance. If these are not sent in a timely manner, you will be responsible for the large portion that your insurance doesn't cover, which can be in the thousands!   These charges can add up quickly and we don't want you to be stuck with large bills or your treatment be stopped for non-payment of the medication. *Reminder Appointments are schedule one appointment ahead if you come every 6-8 weeks, and 2 ahead if you come every 4 weeks. Please know that we cannot infuse if the physician is not present, so if we cannot accommodate your needs, please feel free to ask what other options are available. The nurse will do the best to accommodate your needs , there may be times that this is not always possible. Please be patient as we try to accommodate patients in a safe, and timely manner. If the physician is out, be aware that you may need to go to our [facility], located on campus of [facility], to have your treatment in a timely manner. If you have questions, please speak with a member of the Rheumatology staff. We are here to help and guide you through this infusion process and ensure that you get the treatment that you need. Thank you for letting us take care of you! Thank you for your cooperation and help! We are grateful to serve you! You are receiving this because you are enrolled in a Copay Savings Program offered by the  for your infusion. This helps significantly with the cost of your medication! Please be aware:   You should have received information for the Copay Savings Program in the mail from the . Please don't throw this away! It has important information, such as phone numbers and what you need to do. It will provide easy, step by step instructions, along with a number to call if you are confused.  The Copay Savings Program covers a large portion of the medication ONLY. This typically brings the cost of the medication down to only $5 per infusion.  You and your insurance are responsible for any administration costs.  You are responsible for turning in all of your Explanation of Benefits (EOBs) to the Copay Savings Program.  These come from your insurance carrier.   Please contact your program for any additional information. You may also need to contact your insurance carrier for a detailed EOB.  Please do not turn your EOBs into us, as this creates a delay. You can mail, fax or upload the information into your program's portal.     Depending on your program, your funds will either be loaded onto a card or mailed by check to us on your behalf. This will then be applied to your balance when you call or bring in your card or we receive the check.  It is important to understand that if you do not turn the information into the Copay Savings Program in a timely manner, you may be responsible for the cost of the drug. We definitely want to avoid this, as these medications can be very costly and may result in a delay in your treatment.  Please reach out if you have any questions! Below are some helpful numbers for Copay Support Programs. Trisha Ferrer (Arianna Hernandes): [phone]. Suresh Curiel go to [employer]. Sharlett Larry. com. For [employer] Program go to [employer] [phone]    *A detailed EOB includes carrier name and logo, name of the plan, patient's responsibility, date of service and drug code broken out by name, J-code or National Drug Code ([address]). *Reminder Appointments are schedule one appointment ahead if you come every 6-8 weeks, and 2 ahead if you come every 4 weeks. Please know that we cannot infuse if the physician is not present. If we cannot accommodate your needs, please feel free to ask what other options are available. The nurse will do the best to accommodate your needs , there may be times that this is not always possible. Please be patient as we try to accommodate patients in a safe, and timely manner. If the physician is out, be aware that you may need to go to our [facility] to have your treatment in a timely manner.  If you have questions, please speak with a member of the Rheumatology staff. We are here to help and guide you through this infusion process and ensure that you get the treatment that you need.

## 2022-06-02 RX ORDER — METHOTREXATE 2.5 MG/1
TABLET ORAL
Qty: 104 TABLET | Refills: 0 | Status: SHIPPED | OUTPATIENT
Start: 2022-06-02 | End: 2022-08-18 | Stop reason: SDUPTHER

## 2022-06-22 ENCOUNTER — OFFICE VISIT (OUTPATIENT)
Dept: RHEUMATOLOGY | Age: 52
End: 2022-06-22
Payer: COMMERCIAL

## 2022-06-22 ENCOUNTER — NURSE ONLY (OUTPATIENT)
Dept: INFUSION THERAPY | Age: 52
End: 2022-06-22
Payer: COMMERCIAL

## 2022-06-22 VITALS
TEMPERATURE: 97.6 F | BODY MASS INDEX: 28.35 KG/M2 | HEART RATE: 100 BPM | SYSTOLIC BLOOD PRESSURE: 136 MMHG | WEIGHT: 155 LBS | DIASTOLIC BLOOD PRESSURE: 91 MMHG

## 2022-06-22 DIAGNOSIS — M05.79 RHEUMATOID ARTHRITIS INVOLVING MULTIPLE SITES WITH POSITIVE RHEUMATOID FACTOR (HCC): Primary | ICD-10-CM

## 2022-06-22 DIAGNOSIS — Z79.899 HIGH RISK MEDICATION USE: ICD-10-CM

## 2022-06-22 LAB
ALT SERPL-CCNC: 14 U/L (ref 10–40)
AST SERPL-CCNC: 15 U/L (ref 15–37)
BASOPHILS ABSOLUTE: 0 K/UL (ref 0–0.2)
BASOPHILS RELATIVE PERCENT: 0.8 %
C-REACTIVE PROTEIN: 4.8 MG/L (ref 0–5.1)
CREAT SERPL-MCNC: 0.8 MG/DL (ref 0.6–1.1)
EOSINOPHILS ABSOLUTE: 0.1 K/UL (ref 0–0.6)
EOSINOPHILS RELATIVE PERCENT: 2.8 %
GFR AFRICAN AMERICAN: >60
GFR NON-AFRICAN AMERICAN: >60
HCT VFR BLD CALC: 36.4 % (ref 36–48)
HEMOGLOBIN: 12.5 G/DL (ref 12–16)
LYMPHOCYTES ABSOLUTE: 1.3 K/UL (ref 1–5.1)
LYMPHOCYTES RELATIVE PERCENT: 35.8 %
MCH RBC QN AUTO: 34.7 PG (ref 26–34)
MCHC RBC AUTO-ENTMCNC: 34.4 G/DL (ref 31–36)
MCV RBC AUTO: 100.9 FL (ref 80–100)
MONOCYTES ABSOLUTE: 0.3 K/UL (ref 0–1.3)
MONOCYTES RELATIVE PERCENT: 9.6 %
NEUTROPHILS ABSOLUTE: 1.8 K/UL (ref 1.7–7.7)
NEUTROPHILS RELATIVE PERCENT: 51 %
PDW BLD-RTO: 13.5 % (ref 12.4–15.4)
PLATELET # BLD: 213 K/UL (ref 135–450)
PMV BLD AUTO: 8.6 FL (ref 5–10.5)
RBC # BLD: 3.61 M/UL (ref 4–5.2)
SEDIMENTATION RATE, ERYTHROCYTE: 12 MM/HR (ref 0–30)
WBC # BLD: 3.6 K/UL (ref 4–11)

## 2022-06-22 PROCEDURE — 96413 CHEMO IV INFUSION 1 HR: CPT | Performed by: INTERNAL MEDICINE

## 2022-06-22 PROCEDURE — 36415 COLL VENOUS BLD VENIPUNCTURE: CPT | Performed by: INTERNAL MEDICINE

## 2022-06-22 PROCEDURE — 99214 OFFICE O/P EST MOD 30 MIN: CPT | Performed by: INTERNAL MEDICINE

## 2022-06-22 RX ORDER — SODIUM CHLORIDE 9 MG/ML
INJECTION, SOLUTION INTRAVENOUS ONCE
Status: CANCELLED | OUTPATIENT
Start: 2022-07-13 | End: 2022-07-13

## 2022-06-22 RX ORDER — SODIUM CHLORIDE 0.9 % (FLUSH) 0.9 %
5-40 SYRINGE (ML) INJECTION PRN
Status: CANCELLED | OUTPATIENT
Start: 2022-07-13

## 2022-06-22 RX ORDER — SODIUM CHLORIDE 9 MG/ML
INJECTION, SOLUTION INTRAVENOUS CONTINUOUS
Status: CANCELLED | OUTPATIENT
Start: 2022-07-13

## 2022-06-22 RX ORDER — FAMOTIDINE 10 MG/ML
20 INJECTION, SOLUTION INTRAVENOUS ONCE
Status: CANCELLED | OUTPATIENT
Start: 2022-07-13 | End: 2022-07-13

## 2022-06-22 RX ORDER — EPINEPHRINE 1 MG/ML
0.3 INJECTION, SOLUTION, CONCENTRATE INTRAVENOUS PRN
Status: CANCELLED | OUTPATIENT
Start: 2022-07-13

## 2022-06-22 RX ORDER — SODIUM CHLORIDE 9 MG/ML
25 INJECTION, SOLUTION INTRAVENOUS PRN
Status: CANCELLED | OUTPATIENT
Start: 2022-07-13

## 2022-06-22 RX ORDER — DIPHENHYDRAMINE HYDROCHLORIDE 50 MG/ML
50 INJECTION INTRAMUSCULAR; INTRAVENOUS ONCE
Status: CANCELLED | OUTPATIENT
Start: 2022-07-13 | End: 2022-07-13

## 2022-06-22 RX ORDER — METHYLPREDNISOLONE SODIUM SUCCINATE 125 MG/2ML
125 INJECTION, POWDER, LYOPHILIZED, FOR SOLUTION INTRAMUSCULAR; INTRAVENOUS ONCE
Status: CANCELLED | OUTPATIENT
Start: 2022-07-13 | End: 2022-07-13

## 2022-06-22 RX ORDER — HEPARIN SODIUM (PORCINE) LOCK FLUSH IV SOLN 100 UNIT/ML 100 UNIT/ML
500 SOLUTION INTRAVENOUS PRN
Status: CANCELLED | OUTPATIENT
Start: 2022-07-13

## 2022-06-22 NOTE — PROGRESS NOTES
Patient arrived on time for her Simponi Aria infusion. She is alert and oriented and denies any recent infections, illnesses, or surgeries. Her weight =155.2lb = 70.3kg. Simponi Aria 2mg/kg = 140mg. Dose rounded to 150mg per Dr Darryl Wick orders. Labs drawn today and Quantiferon Gold drawn today and patient saw Dr Shira Epley today. Patient tolerated infusion well and her next appointment is schedule on 08/22/2022.     IV Gauge: #22  IV Site: RAC  # of Attempts: 1   Infusion Start: 7434  Infusion Stop: 2641  IV Volume:100ml

## 2022-06-22 NOTE — PROGRESS NOTES
[facility] MD                                                                                                                          (G) [phone] (F)      Dear Dr. Ambrosio Hernandez:  Please find Rheumatology assessment. Thank you for giving me the opportunity to be involved in Ramsey Nunez's care and I look forward following Ramsey Garcia along with you. If you have any questions or concerns please feel free to reach me. Note is transcribed using voice recognition software. Inadvertent computerized transcription errors may be present. Patient identification: Brian Nunez,: 1970,52 y.o. Sex: female     Assessment / Plan:    Ramsey Garcia was seen today for follow-up. Diagnoses and all orders for this visit:    Rheumatoid arthritis involving multiple sites with positive rheumatoid factor (HCC)    High risk medication use      Seropositive erosive RA-  Official diagnosis- 2018-asymptomatic on Simponi Aria IV therapy and methotrexate 20 mg weekly. Notices some minor arthralgias a few days prior to infusion, otherwise doing well. History of positive PPD in past, took [facility] for 6-9 months-approximately approximately , no history of any active tuberculosis. TB QuantiFERON test -negative from-2019    Plan-  Reduce methotrexate 7 tablets a week for a month, if doing well, reduce 6 tablets a week. Continue Simponi Aria infusion. Encouraged patient to lose weight that would likely to help to slow down the progression of knee osteoarthritis. Patient indicates understanding and agrees with the management plan.   I reviewed patient's history, referral documents and electronic medical records. .  #######################################################################    Etscabgkqs-flrbyc-vq for seropositive rheumatoid arthritis. Interval changes-  States that other than minor arthralgias in her finger joints a few days before her infusions, she is doing very well. No flares or daily symptoms. Normal ADLs and recreational activities. No side effects from medications. She is pleased on current regimen. Past Medical History:   Diagnosis Date    Depression     High risk medication use 2/22/2022     No past surgical history on file. FH- P Aunt - RA. Current Outpatient Medications   Medication Sig Dispense Refill    methotrexate (RHEUMATREX) 2.5 MG chemo tablet TAKE 8 TABLETS BY MOUTH ONCE A WEEK SAME DAY EVERY WEEK SPLIT DOSE 6-8 HOURS APART ON THE SAME DAY. 104 tablet 0    Multiple Vitamins-Minerals (THERAPEUTIC MULTIVITAMIN-MINERALS) tablet Take 1 tablet by mouth daily      folic acid (FOLVITE) 1 MG tablet Take 1 tab po daily. 90 tablet 3    Zolpidem Tartrate (AMBIEN PO) Take 12 mg by mouth nightly as needed       FLUoxetine (PROZAC) 10 MG capsule Take 10 mg by mouth daily       No current facility-administered medications for this visit. No Known Allergies    PHYSICAL EXAM:    Vitals: There were no vitals taken for this visit. General appearance/ Psychiatric: well nourished, and well groomed, normal judgement, alert, appears stated age and cooperative. MKS:   28 joint JOINT COUNT:                               Right                                                  Left   Swell Tender Swell Tender   PIP1           PIP2         PIP3       PIP4       PIP5          MCP1           MCP2       MCP3       MCP4           MCP5           Wrist        Elbow           Shoulder          Knee             Other than bony crepitus in her knees, no appreciable tender, swollen, inflamed joints in upper or lower extremities. F ROM in all peripheral joints.   Skin: No rashes, no induration or skin thickening or nodules. No evidence ischemia or deformities noted in digits or nails. DATA:   Lab Results   Component Value Date    WBC 4.3 04/20/2022    HGB 12.7 04/20/2022    HCT 38.8 04/20/2022    .9 (H) 04/20/2022     04/20/2022         Chemistry        Component Value Date/Time     11/24/2021 1050    K 4.5 11/24/2021 1050     11/24/2021 1050    CO2 19 (L) 11/24/2021 1050    BUN 8 11/24/2021 1050    CREATININE 0.7 04/20/2022 0937        Component Value Date/Time    CALCIUM 9.2 11/24/2021 1050    ALKPHOS 150 (H) 11/24/2021 1050    AST 13 (L) 04/20/2022 0937    ALT 12 04/20/2022 0937    BILITOT <0.2 11/24/2021 1050          Lab Results   Component Value Date    LABURIC 5.9 04/12/2018     Lab Results   Component Value Date    SEDRATE 13 04/20/2022     Lab Results   Component Value Date    CRP 6.9 (H) 04/20/2022          Radiology Review:   Left wrist X rays- joint space narrowing of carpal joints, erosions in carpal bones. A/P- See above. Total time 30 minutes that includes the following-  Preparing to see the patient such as reviewing patients records, pre-charting, preparing the visit on the same day, performing a medically appropriate history and physical examination, counseling and educating patient about diagnosis, management plan, ordering appropriate testings, prescriptions, communicating findings to other care providers, and documenting clinical information in electronic medical record.

## 2022-06-25 LAB
QUANTI TB GOLD PLUS: NEGATIVE
QUANTI TB1 MINUS NIL: 0.01 IU/ML (ref 0–0.34)
QUANTI TB2 MINUS NIL: 0 IU/ML (ref 0–0.34)
QUANTIFERON MITOGEN: 9.57 IU/ML
QUANTIFERON NIL: 0.01 IU/ML

## 2022-07-12 RX ORDER — FOLIC ACID 1 MG/1
TABLET ORAL
Qty: 90 TABLET | Refills: 3 | Status: SHIPPED | OUTPATIENT
Start: 2022-07-12

## 2022-07-14 ENCOUNTER — TELEPHONE (OUTPATIENT)
Dept: INFUSION THERAPY | Age: 52
End: 2022-07-14

## 2022-07-14 NOTE — TELEPHONE ENCOUNTER
Left voice mail message, requesting call back. Infusion closed the week of 8/22/2022. Need to reschedule appointment. Have an appointment available 8/17/2022 or 8/18/2022 at 830 am or 845 am or 900 am or 915 am.  Waiting return call.

## 2022-07-19 ENCOUNTER — TELEPHONE (OUTPATIENT)
Dept: INFUSION THERAPY | Age: 52
End: 2022-07-19

## 2022-07-19 NOTE — TELEPHONE ENCOUNTER
Left voice mail message, requesting call back. Infusion closed the week of 8/22/2022. Need to reschedule appointment. 8/18/2022 at 830 am or 845 am or 900 am .  Waiting return call.

## 2022-07-21 ENCOUNTER — TELEPHONE (OUTPATIENT)
Dept: INFUSION THERAPY | Age: 52
End: 2022-07-21

## 2022-07-21 NOTE — Clinical Note
[facility]  [address]  [address]  Phone: [phone]  Fax: [phone]    Lajuanda Ganser, MD        July 21, 2022     [address] [place] [address]      Dear Samantha Mcgregor: This letter is a reminder that your {:83259} {:78316} due. Please call our office to schedule an appointment. If you've already underwent or scheduled {:85900}, please disregard this notice.     Sincerely,        Lajuanda Ganser, MD

## 2022-07-21 NOTE — TELEPHONE ENCOUNTER
Left voice mail message, requesting call back. Infusion closed the week of 8/22/2022. Appointment rescheduled for   8/18/2022 at 1030. Letter sent. Waiting return call.

## 2022-08-18 ENCOUNTER — OFFICE VISIT (OUTPATIENT)
Dept: RHEUMATOLOGY | Age: 52
End: 2022-08-18
Payer: COMMERCIAL

## 2022-08-18 ENCOUNTER — NURSE ONLY (OUTPATIENT)
Dept: INFUSION THERAPY | Age: 52
End: 2022-08-18
Payer: COMMERCIAL

## 2022-08-18 VITALS
DIASTOLIC BLOOD PRESSURE: 78 MMHG | HEART RATE: 80 BPM | WEIGHT: 152 LBS | RESPIRATION RATE: 16 BRPM | SYSTOLIC BLOOD PRESSURE: 134 MMHG | BODY MASS INDEX: 27.8 KG/M2 | TEMPERATURE: 98.7 F

## 2022-08-18 DIAGNOSIS — M05.79 RHEUMATOID ARTHRITIS INVOLVING MULTIPLE SITES WITH POSITIVE RHEUMATOID FACTOR (HCC): Primary | ICD-10-CM

## 2022-08-18 DIAGNOSIS — Z79.899 HIGH RISK MEDICATION USE: ICD-10-CM

## 2022-08-18 LAB
ALT SERPL-CCNC: 15 U/L (ref 10–40)
AST SERPL-CCNC: 15 U/L (ref 15–37)
BASOPHILS ABSOLUTE: 0 K/UL (ref 0–0.2)
BASOPHILS RELATIVE PERCENT: 0.5 %
C-REACTIVE PROTEIN: 9.4 MG/L (ref 0–5.1)
CREAT SERPL-MCNC: 0.8 MG/DL (ref 0.6–1.1)
EOSINOPHILS ABSOLUTE: 0.1 K/UL (ref 0–0.6)
EOSINOPHILS RELATIVE PERCENT: 2.6 %
GFR AFRICAN AMERICAN: >60
GFR NON-AFRICAN AMERICAN: >60
HCT VFR BLD CALC: 38.2 % (ref 36–48)
HEMOGLOBIN: 12.9 G/DL (ref 12–16)
LYMPHOCYTES ABSOLUTE: 1.3 K/UL (ref 1–5.1)
LYMPHOCYTES RELATIVE PERCENT: 28.9 %
MCH RBC QN AUTO: 34.5 PG (ref 26–34)
MCHC RBC AUTO-ENTMCNC: 33.9 G/DL (ref 31–36)
MCV RBC AUTO: 102 FL (ref 80–100)
MONOCYTES ABSOLUTE: 0.2 K/UL (ref 0–1.3)
MONOCYTES RELATIVE PERCENT: 5.1 %
NEUTROPHILS ABSOLUTE: 2.8 K/UL (ref 1.7–7.7)
NEUTROPHILS RELATIVE PERCENT: 62.9 %
PDW BLD-RTO: 14.2 % (ref 12.4–15.4)
PLATELET # BLD: 255 K/UL (ref 135–450)
PMV BLD AUTO: 8.7 FL (ref 5–10.5)
RBC # BLD: 3.74 M/UL (ref 4–5.2)
SEDIMENTATION RATE, ERYTHROCYTE: 17 MM/HR (ref 0–30)
WBC # BLD: 4.5 K/UL (ref 4–11)

## 2022-08-18 PROCEDURE — 99214 OFFICE O/P EST MOD 30 MIN: CPT | Performed by: INTERNAL MEDICINE

## 2022-08-18 PROCEDURE — 96413 CHEMO IV INFUSION 1 HR: CPT | Performed by: INTERNAL MEDICINE

## 2022-08-18 PROCEDURE — 36415 COLL VENOUS BLD VENIPUNCTURE: CPT | Performed by: INTERNAL MEDICINE

## 2022-08-18 RX ORDER — METHOTREXATE 2.5 MG/1
TABLET ORAL
Qty: 104 TABLET | Refills: 0 | Status: SHIPPED | OUTPATIENT
Start: 2022-08-18

## 2022-08-18 RX ORDER — SODIUM CHLORIDE 9 MG/ML
INJECTION, SOLUTION INTRAVENOUS ONCE
Status: CANCELLED | OUTPATIENT
Start: 2022-09-08 | End: 2022-09-08

## 2022-08-18 RX ORDER — METHYLPREDNISOLONE SODIUM SUCCINATE 125 MG/2ML
125 INJECTION, POWDER, LYOPHILIZED, FOR SOLUTION INTRAMUSCULAR; INTRAVENOUS ONCE
Status: CANCELLED | OUTPATIENT
Start: 2022-09-08 | End: 2022-09-08

## 2022-08-18 RX ORDER — EPINEPHRINE 1 MG/ML
0.3 INJECTION, SOLUTION, CONCENTRATE INTRAVENOUS PRN
Status: CANCELLED | OUTPATIENT
Start: 2022-09-08

## 2022-08-18 RX ORDER — FAMOTIDINE 10 MG/ML
20 INJECTION, SOLUTION INTRAVENOUS ONCE
Status: CANCELLED | OUTPATIENT
Start: 2022-09-08 | End: 2022-09-08

## 2022-08-18 RX ORDER — SODIUM CHLORIDE 0.9 % (FLUSH) 0.9 %
5-40 SYRINGE (ML) INJECTION PRN
Status: CANCELLED | OUTPATIENT
Start: 2022-09-08

## 2022-08-18 RX ORDER — DIPHENHYDRAMINE HYDROCHLORIDE 50 MG/ML
50 INJECTION INTRAMUSCULAR; INTRAVENOUS ONCE
Status: CANCELLED | OUTPATIENT
Start: 2022-09-08 | End: 2022-09-08

## 2022-08-18 RX ORDER — SODIUM CHLORIDE 9 MG/ML
INJECTION, SOLUTION INTRAVENOUS CONTINUOUS
Status: CANCELLED | OUTPATIENT
Start: 2022-09-08

## 2022-08-18 RX ORDER — SODIUM CHLORIDE 9 MG/ML
25 INJECTION, SOLUTION INTRAVENOUS PRN
Status: CANCELLED | OUTPATIENT
Start: 2022-09-08

## 2022-08-18 RX ORDER — HEPARIN SODIUM (PORCINE) LOCK FLUSH IV SOLN 100 UNIT/ML 100 UNIT/ML
500 SOLUTION INTRAVENOUS PRN
Status: CANCELLED | OUTPATIENT
Start: 2022-09-08

## 2022-08-18 NOTE — PROGRESS NOTES
[address] [address] [phone] (K) [phone] (F)      Dear Dr. Janine Muniz:  Please find Rheumatology assessment. Thank you for giving me the opportunity to be involved in Elmira Nunez's care and I look forward following Elmira Hernandez along with you. If you have any questions or concerns please feel free to reach me. Note is transcribed using voice recognition software. Inadvertent computerized transcription errors may be present. Patient identification: Koffi Nunez,: 1970,52 y.o. Sex: female     Assessment / Plan:    Elmira Hernandez was seen today for follow-up. Diagnoses and all orders for this visit:    Rheumatoid arthritis involving multiple sites with positive rheumatoid factor (HCC)    High risk medication use    Other orders  -     methotrexate (RHEUMATREX) 2.5 MG chemo tablet; Take 8 tab po once a week. Seropositive erosive RA-diagnosis 2018-worsening arthralgias after reducing methotrexate to 15 mg weekly. Increase methotrexate 20 mg weekly. Continue Simponi Aria IV therapy. Safety lab will be checked today. History of positive PPD in past, took [facility] for 6-9 months-approximately approximately , no history of any active tuberculosis. TB QuantiFERON test -negative from-2019    Plan-  As above. Prescription renewed. Call with any worsening symptoms. Patient indicates understanding and agrees with the management plan. I reviewed patient's history, referral documents and electronic medical records. .  #######################################################################    Majvxhgaau-aubclt-fq for seropositive rheumatoid arthritis.   Interval changes-  She has been experiencing worsening arthralgias in her finger joints, knees after reducing methotrexate dose in the last visit. No overt flares. No side effects from medications. Normal ADLs and recreational activities. Does not have any extra-articular manifestations of RA. Past Medical History:   Diagnosis Date    Depression     High risk medication use 2/22/2022     No past surgical history on file. FH- P Aunt - RA. Current Outpatient Medications   Medication Sig Dispense Refill    methotrexate (RHEUMATREX) 2.5 MG chemo tablet Take 8 tab po once a week. 440 tablet 0    folic acid (FOLVITE) 1 MG tablet TAKE ONE (1) TAB BY MOUTH DAILY. 90 tablet 3    Multiple Vitamins-Minerals (THERAPEUTIC MULTIVITAMIN-MINERALS) tablet Take 1 tablet by mouth daily      Zolpidem Tartrate (AMBIEN PO) Take 12 mg by mouth nightly as needed       FLUoxetine (PROZAC) 10 MG capsule Take 10 mg by mouth daily       No current facility-administered medications for this visit. No Known Allergies    PHYSICAL EXAM:    Vitals: There were no vitals taken for this visit. General appearance/ Psychiatric: well nourished, and well groomed, normal judgement, alert, appears stated age and cooperative. MKS:   28 joint JOINT COUNT:                               Right                                                  Left   Swell Tender Swell Tender   PIP1           PIP2         PIP3       PIP4       PIP5          MCP1           MCP2       MCP3       MCP4           MCP5           Wrist        Elbow           Shoulder          Knee           No overtly tender, swollen or inflamed joints appreciated in upper or lower extremities. Subjective arthralgias in her knees, MCPs, PIPs and wrist.  F ROM in all peripheral joints. Skin: No rashes, no induration or skin thickening or nodules. No evidence ischemia or deformities noted in digits or nails.     DATA:   Lab Results   Component Value Date    WBC 3.6 (L) 06/22/2022    HGB 12.5 06/22/2022    HCT 36.4 06/22/2022    .9 (H) 06/22/2022     06/22/2022         Chemistry        Component Value Date/Time     11/24/2021 1050    K 4.5 11/24/2021 1050     11/24/2021 1050    CO2 19 (L) 11/24/2021 1050    BUN 8 11/24/2021 1050    CREATININE 0.8 06/22/2022 0940        Component Value Date/Time    CALCIUM 9.2 11/24/2021 1050    ALKPHOS 150 (H) 11/24/2021 1050    AST 15 06/22/2022 0940    ALT 14 06/22/2022 0940    BILITOT <0.2 11/24/2021 1050          Lab Results   Component Value Date    LABURIC 5.9 04/12/2018     Lab Results   Component Value Date    SEDRATE 12 06/22/2022     Lab Results   Component Value Date    CRP 4.8 06/22/2022          Radiology Review:   Left wrist X rays- joint space narrowing of carpal joints, erosions in carpal bones. A/P- See above. Total time 31 minutes that includes the following-  Preparing to see the patient such as reviewing patients records, pre-charting, preparing the visit on the same day, performing a medically appropriate history and physical examination, counseling and educating patient about diagnosis, management plan, ordering appropriate testings, prescriptions, communicating findings to other care providers, and documenting clinical information in electronic medical record.

## 2022-08-18 NOTE — PROGRESS NOTES
Follow up visit with Dr. Antonio Price and labs today: cbc, creatinine, crp, AST, ALT, and sed rate drawn.

## 2022-08-18 NOTE — PROGRESS NOTES
8/18/2022  Pt here for Simponi Aria infusion. Follow up visit with Dr. Carina Gill and labs today: cbc, creatinine, crp, AST, ALT, and sed rate drawn. Patient denies current infections, illnesses, or recent surgeries. No  Adverse effects from previous infusion. Today 152 lbs =68 kg   X 2 mg/kg = 136 mg, Rounded to 150 mg per MD.  Infusion  tolerated well. Vitals stable. Next visit scheduled  in 8 weeks. IV Gauge: #22 Saf T intima  IV Site: right antecubital  # of Attempts: 1  Infusion Start time: 1020 am  Infusion Stop time: 1050 am      See Therapy Plan, flowsheets and MAR    Administrations This Visit       golimumab 137.5 mg in sodium chloride 0.9 % 100 mL IVPB       Admin Date  08/18/2022  10:20 Action  New Bag Dose  150 mg Rate  200 mL/hr Route  IntraVENous Site   Administered By  Umer Shah RN    Ordering Provider: Melany Gaona MD    Patient Supplied?: No    Comments: simponi aria 150 mg per dr Sánchez Del Rio.

## 2022-08-18 NOTE — PATIENT INSTRUCTIONS
You are receiving this because you are enrolled in a Copay Savings Program offered by the  for your infusion. This helps significantly with the cost of your medication! Please be aware: You should have received information for the Copay Savings Program in the mail from the . Please don't throw this away! It has important information, such as phone numbers and what you need to do. It will provide easy, step by step instructions, along with a number to call if you are confused. The Copay Savings Program covers a large portion of the medication ONLY. This typically brings the cost of the medication down to only $5 per infusion. You and your insurance are responsible for any administration costs. You are responsible for turning in all of your Explanation of Benefits (EOBs) to the Copay Savings Program.  These come from your insurance carrier. Please contact your program for any additional information. You may also need to contact your insurance carrier for a detailed EOB. Please do not turn your EOBs into us, as this creates a delay. You can mail, fax or upload the information into your program's portal.    Depending on your program, your funds will either be loaded onto a card or mailed by check to us on your behalf. This will then be applied to your balance when you call or bring in your card or we receive the check. It is important to understand that if you do not turn the information into the Copay Savings Program in a timely manner, you may be responsible for the cost of the drug. We definitely want to avoid this, as these medications can be very costly and may result in a delay in your treatment. Please reach out if you have any questions! Below are some helpful numbers for Copay Support Programs. Vidya Chapman (Rosa Costello): [phone]. Suresh Curiel go to [employer]. [employer].   For [employer] go to [address] Billing Department [phone]    *A detailed EOB includes carrier name and logo, name of the plan, patient's responsibility, date of service and drug code broken out by name, J-code or National Drug Code ([address]). *Reminder Appointments are schedule one appointment ahead if you come every 6-8 weeks, and 2 ahead if you come every 4 weeks. Please know that we cannot infuse if the physician is not present. If we cannot accommodate your needs, please feel free to ask what other options are available. The nurse will do the best to accommodate your needs , there may be times that this is not always possible. Please be patient as we try to accommodate patients in a safe, and timely manner. If the physician is out, be aware that you may need to go to our [facility] to have your treatment in a timely manner. If you have questions, please speak with a member of the Rheumatology staff. We are here to help and guide you through this infusion process and ensure that you get the treatment that you need.

## 2022-10-17 ENCOUNTER — OFFICE VISIT (OUTPATIENT)
Dept: RHEUMATOLOGY | Age: 52
End: 2022-10-17
Payer: COMMERCIAL

## 2022-10-17 ENCOUNTER — NURSE ONLY (OUTPATIENT)
Dept: INFUSION THERAPY | Age: 52
End: 2022-10-17
Payer: COMMERCIAL

## 2022-10-17 VITALS
BODY MASS INDEX: 28.48 KG/M2 | RESPIRATION RATE: 16 BRPM | DIASTOLIC BLOOD PRESSURE: 78 MMHG | SYSTOLIC BLOOD PRESSURE: 138 MMHG | WEIGHT: 155.7 LBS | TEMPERATURE: 98 F | HEART RATE: 80 BPM

## 2022-10-17 DIAGNOSIS — M05.79 RHEUMATOID ARTHRITIS INVOLVING MULTIPLE SITES WITH POSITIVE RHEUMATOID FACTOR (HCC): Primary | ICD-10-CM

## 2022-10-17 DIAGNOSIS — Z79.899 HIGH RISK MEDICATION USE: ICD-10-CM

## 2022-10-17 LAB
ALT SERPL-CCNC: 16 U/L (ref 10–40)
AST SERPL-CCNC: 15 U/L (ref 15–37)
BASOPHILS ABSOLUTE: 0 K/UL (ref 0–0.2)
BASOPHILS RELATIVE PERCENT: 0.9 %
C-REACTIVE PROTEIN: 7.2 MG/L (ref 0–5.1)
CREAT SERPL-MCNC: 0.7 MG/DL (ref 0.6–1.1)
EOSINOPHILS ABSOLUTE: 0.2 K/UL (ref 0–0.6)
EOSINOPHILS RELATIVE PERCENT: 4.8 %
GFR SERPL CREATININE-BSD FRML MDRD: >60 ML/MIN/{1.73_M2}
HCT VFR BLD CALC: 38.3 % (ref 36–48)
HEMOGLOBIN: 12.7 G/DL (ref 12–16)
LYMPHOCYTES ABSOLUTE: 1.1 K/UL (ref 1–5.1)
LYMPHOCYTES RELATIVE PERCENT: 26.2 %
MCH RBC QN AUTO: 34.6 PG (ref 26–34)
MCHC RBC AUTO-ENTMCNC: 33.3 G/DL (ref 31–36)
MCV RBC AUTO: 103.9 FL (ref 80–100)
MONOCYTES ABSOLUTE: 0.6 K/UL (ref 0–1.3)
MONOCYTES RELATIVE PERCENT: 14 %
NEUTROPHILS ABSOLUTE: 2.2 K/UL (ref 1.7–7.7)
NEUTROPHILS RELATIVE PERCENT: 54.1 %
PDW BLD-RTO: 15 % (ref 12.4–15.4)
PLATELET # BLD: 254 K/UL (ref 135–450)
PMV BLD AUTO: 8.7 FL (ref 5–10.5)
RBC # BLD: 3.68 M/UL (ref 4–5.2)
SEDIMENTATION RATE, ERYTHROCYTE: 16 MM/HR (ref 0–30)
WBC # BLD: 4.1 K/UL (ref 4–11)

## 2022-10-17 PROCEDURE — 96413 CHEMO IV INFUSION 1 HR: CPT | Performed by: INTERNAL MEDICINE

## 2022-10-17 PROCEDURE — 99214 OFFICE O/P EST MOD 30 MIN: CPT | Performed by: INTERNAL MEDICINE

## 2022-10-17 PROCEDURE — 36415 COLL VENOUS BLD VENIPUNCTURE: CPT | Performed by: INTERNAL MEDICINE

## 2022-10-17 NOTE — PROGRESS NOTES
10/17/2022  Pt here for simponi aria infusion. Follow up visit with Dr. Tiara Olivas and labs today: cbc, creatinine, crp, AST, ALT, and sed rate drawn. Patient denies current infections, illnesses, or recent surgeries. No  Adverse effects from previous infusion. Today's weight 115 lbs =70 kg   X 2 mg/kg = 140 mg, Rounded to 150 mg per MD.  Infusion  tolerated well. Vitals stable. Next visit scheduled  in 8 weeks. IV Gauge: #22 Saf T intima  IV Site: right antecubital  # of Attempts: 1  Infusion Start time: 1025 am  Infusion Stop time: 1055 am  Medication buy and bill.          See Therapy Plan, flowsheets and [facility]  Administrations This Visit       golimumab 141.25 mg in sodium chloride 0.9 % 100 mL IVPB       Admin Date  10/17/2022  10:25 Action  New Bag Dose  150 mg Rate  200 mL/hr Route  IntraVENous Site   Administered By  Jean-Claude Alegria RN    Ordering Provider: Cb Wagner MD    Patient Supplied?: No    Comments: naveed and bill  simponi aria 150 mg per dr Silvestre Aj

## 2022-10-17 NOTE — PROGRESS NOTES
[address] [facility], [place]                                                                                                                          (B) [phone] (F)      Dear Dr. Hammad Dove:  Please find Rheumatology assessment. Thank you for giving me the opportunity to be involved in Cuauhtemoc Nunez's care and I look forward following Cuauhtemoc Hurley along with you. If you have any questions or concerns please feel free to reach me. Note is transcribed using voice recognition software. Inadvertent computerized transcription errors may be present. Patient identification: Niraj Nunez,: 1970,52 y.o. Sex: female     Assessment / Plan:    Cuauhtemoc Hurley was seen today for follow-up. Diagnoses and all orders for this visit:    Rheumatoid arthritis involving multiple sites with positive rheumatoid factor (HCC)    High risk medication use    1. Seropositive erosive RA-diagnosis 2018-asymptomatic on current regimen of methotrexate 20 mg weekly and Simponi Aria every 8 weekly. Continue current medications. Prescriptions renewed. Safety lab will be checked and reviewed. History of positive PPD in past, took [address] [place] [address] for 6-9 months-approximately approximately , no history of any active tuberculosis. TB QuantiFERON test -negative from-2019    2. Minor intercurrent discomfort in her knees-likely from osteoarthritis. Continue knee exercises/walking, and weight reduction will likely to help as well. Recommend flu vaccine      Call with any worsening symptoms. Patient indicates understanding and agrees with the management plan.   I reviewed patient's history, referral documents and electronic medical records. .  #######################################################################    Anmaurelvj-otgomz-mi for seropositive rheumatoid arthritis. Interval changes-states that she is doing well, does not really have any complaints or concerns other than occasional discomfort in her knees with excessive use. No flares. ADLs and recreational activities are normal.  No side effects from medications. Does not have any extra-articular manifestations of RA. Past Medical History:   Diagnosis Date    Depression     High risk medication use 2/22/2022     No past surgical history on file. FH- P Aunt - RA. Current Outpatient Medications   Medication Sig Dispense Refill    methotrexate (RHEUMATREX) 2.5 MG chemo tablet Take 8 tab po once a week. 896 tablet 0    folic acid (FOLVITE) 1 MG tablet TAKE ONE (1) TAB BY MOUTH DAILY. 90 tablet 3    Multiple Vitamins-Minerals (THERAPEUTIC MULTIVITAMIN-MINERALS) tablet Take 1 tablet by mouth daily      Zolpidem Tartrate (AMBIEN PO) Take 12 mg by mouth nightly as needed       FLUoxetine (PROZAC) 10 MG capsule Take 10 mg by mouth daily       No current facility-administered medications for this visit. No Known Allergies    PHYSICAL EXAM:    Vitals: There were no vitals taken for this visit. General appearance/ Psychiatric: well nourished, and well groomed, normal judgement, alert, appears stated age and cooperative. MKS:   28 joint JOINT COUNT:                               Right                                                  Left   Swell Tender Swell Tender   PIP1           PIP2         PIP3       PIP4       PIP5          MCP1           MCP2       MCP3       MCP4           MCP5           Wrist        Elbow           Shoulder          Knee             Other bony crepitus, bony swelling in her knees, No  tender, swollen or inflamed joints appreciated in upper or lower extremities. Skin: No rashes, no induration or skin thickening or nodules.  No evidence ischemia or deformities noted in digits or nails. DATA:   Lab Results   Component Value Date    WBC 4.5 08/18/2022    HGB 12.9 08/18/2022    HCT 38.2 08/18/2022    .0 (H) 08/18/2022     08/18/2022         Chemistry        Component Value Date/Time     11/24/2021 1050    K 4.5 11/24/2021 1050     11/24/2021 1050    CO2 19 (L) 11/24/2021 1050    BUN 8 11/24/2021 1050    CREATININE 0.8 08/18/2022 1024        Component Value Date/Time    CALCIUM 9.2 11/24/2021 1050    ALKPHOS 150 (H) 11/24/2021 1050    AST 15 08/18/2022 1024    ALT 15 08/18/2022 1024    BILITOT <0.2 11/24/2021 1050          Lab Results   Component Value Date    LABURIC 5.9 04/12/2018     Lab Results   Component Value Date    SEDRATE 17 08/18/2022     Lab Results   Component Value Date    CRP 9.4 (H) 08/18/2022           Total time 30 minutes that includes the following-  Preparing to see the patient such as reviewing patients records, pre-charting, preparing the visit on the same day, performing a medically appropriate history and physical examination, counseling and educating patient about diagnosis, management plan, ordering appropriate testings, prescriptions, communicating findings to other care providers, and documenting clinical information in electronic medical record.

## 2022-12-12 ENCOUNTER — TELEPHONE (OUTPATIENT)
Dept: INFUSION THERAPY | Age: 52
End: 2022-12-12

## 2022-12-12 ENCOUNTER — NURSE ONLY (OUTPATIENT)
Dept: INFUSION THERAPY | Age: 52
End: 2022-12-12
Payer: COMMERCIAL

## 2022-12-12 VITALS
TEMPERATURE: 98.2 F | RESPIRATION RATE: 16 BRPM | BODY MASS INDEX: 28.17 KG/M2 | HEART RATE: 80 BPM | DIASTOLIC BLOOD PRESSURE: 78 MMHG | SYSTOLIC BLOOD PRESSURE: 138 MMHG | WEIGHT: 154 LBS

## 2022-12-12 DIAGNOSIS — M05.79 RHEUMATOID ARTHRITIS INVOLVING MULTIPLE SITES WITH POSITIVE RHEUMATOID FACTOR (HCC): Primary | ICD-10-CM

## 2022-12-12 DIAGNOSIS — Z79.899 HIGH RISK MEDICATION USE: ICD-10-CM

## 2022-12-12 LAB
ALT SERPL-CCNC: 14 U/L (ref 10–40)
AST SERPL-CCNC: 17 U/L (ref 15–37)
BASOPHILS ABSOLUTE: 0 K/UL (ref 0–0.2)
BASOPHILS RELATIVE PERCENT: 0.4 %
C-REACTIVE PROTEIN: 6.4 MG/L (ref 0–5.1)
CREAT SERPL-MCNC: 0.7 MG/DL (ref 0.6–1.1)
EOSINOPHILS ABSOLUTE: 0.1 K/UL (ref 0–0.6)
EOSINOPHILS RELATIVE PERCENT: 2.6 %
GFR SERPL CREATININE-BSD FRML MDRD: >60 ML/MIN/{1.73_M2}
HCT VFR BLD CALC: 38.8 % (ref 36–48)
HEMOGLOBIN: 12.9 G/DL (ref 12–16)
LYMPHOCYTES ABSOLUTE: 1.8 K/UL (ref 1–5.1)
LYMPHOCYTES RELATIVE PERCENT: 38.4 %
MCH RBC QN AUTO: 35 PG (ref 26–34)
MCHC RBC AUTO-ENTMCNC: 33.2 G/DL (ref 31–36)
MCV RBC AUTO: 105.5 FL (ref 80–100)
MONOCYTES ABSOLUTE: 0.5 K/UL (ref 0–1.3)
MONOCYTES RELATIVE PERCENT: 10.6 %
NEUTROPHILS ABSOLUTE: 2.2 K/UL (ref 1.7–7.7)
NEUTROPHILS RELATIVE PERCENT: 48 %
PDW BLD-RTO: 14.1 % (ref 12.4–15.4)
PLATELET # BLD: 236 K/UL (ref 135–450)
PMV BLD AUTO: 9.7 FL (ref 5–10.5)
RBC # BLD: 3.68 M/UL (ref 4–5.2)
SEDIMENTATION RATE, ERYTHROCYTE: 11 MM/HR (ref 0–30)
WBC # BLD: 4.6 K/UL (ref 4–11)

## 2022-12-12 PROCEDURE — 36415 COLL VENOUS BLD VENIPUNCTURE: CPT | Performed by: INTERNAL MEDICINE

## 2022-12-12 PROCEDURE — 96413 CHEMO IV INFUSION 1 HR: CPT | Performed by: INTERNAL MEDICINE

## 2022-12-12 NOTE — TELEPHONE ENCOUNTER
VOB: Yes for 2023 and notify patient  CO PAY ASSISTANCE: yes    IF NO PRIOR AUTHORIZATION REQUired need predetermination:    DRUG NAME Simponi Aria ()     CURRENT WEIGHT   IN KILOGRAMS 69.9  .     DOSE:  2 MG/KG IV      FREQUENCY EVERY 8 WEEKS                          INFUSION STATUS: CONTINUATION of infusion     DX CODE m05.79    NEXT INFUSION DATE : NEXT INFUSION DATE 2/6/2023

## 2022-12-12 NOTE — PROGRESS NOTES
12/12/2022 Labs today: cbc, creatinine, crp, AST, ALT, and sed rate drawn. Pt here for Simponi aira infusion. Dose per Dr Callie Ibrahim orders. Patient denies current infections, illnesses, or recent surgeries. No  Adverse effects from previous infusion. Infusion  tolerated well. Vitals stable. Next visit scheduled. IV Gauge: #22 Saf  T Intima  IV Site: right antecubital  # of Attempts: 1  Infusion  Start: 1005 am  Infusion  Stop: 1035 am       See Therapy plan, flowsheets, and MAR  Wt Readings from Last 3 Encounters:   12/12/22 154 lb (69.9 kg)   10/17/22 155 lb 11.2 oz (70.6 kg)   08/18/22 152 lb (68.9 kg)      Administrations This Visit       golimumab 140 mg in sodium chloride 0.9 % 100 mL IVPB       Admin Date  12/12/2022  10:05 Action  New Bag Dose  150 mg Rate  200 mL/hr Route  IntraVENous Site   Administered By  Juan Luna RN    Ordering Provider: Bertram Gupta MD    Patient Supplied?: No    Comments: simponi aria 150 mg per dr Mateo Leo orders.  buy and bill

## 2022-12-13 RX ORDER — METHOTREXATE 2.5 MG/1
TABLET ORAL
Qty: 104 TABLET | Refills: 0 | Status: SHIPPED | OUTPATIENT
Start: 2022-12-13

## 2022-12-13 NOTE — TELEPHONE ENCOUNTER
[facility] needs to email pt authorization before I can proceed with her VOB. I tried to call the pt but her VM is full. I need to know if she has an email address and what it is please.

## 2023-02-02 NOTE — PROGRESS NOTES
2/6/2023 Arrived at 1115 am for 1130 am appointment. Labs drawn today. Pt here for Simponi Aria infusion. Dose per Dr Angy Nesbitt orders. Patient denies current infections, illnesses, or recent surgeries. No  Adverse effects from previous infusion. Infusion  tolerated well. Vitals stable. Next infusion visit at [facility] [employer]  IV Gauge: #22 Saf T Intima  IV Site: right antecubital  # of Attempts: 1  Infusion  Start: 1125 am  Infusion  Stop: 1155 am       See Therapy plan, flowsheets, and MAR  Wt Readings from Last 3 Encounters:   02/06/23 154 lb 3.2 oz (69.9 kg)   12/12/22 154 lb (69.9 kg)   10/17/22 155 lb 11.2 oz (70.6 kg)      Administrations This Visit       golimumab 140 mg in sodium chloride 0.9 % 100 mL IVPB       Admin Date  02/06/2023  11:25 Action  New Bag Dose  150 mg Rate  200 mL/hr Route  IntraVENous Site   Administered By  James Eng RN    Ordering Provider: Edison Camacho MD    Patient Supplied?: No    Comments: buy and bill simopni aria 150 mg per dr Saeid Huggins.
auscultated w/ stethoscope

## 2023-02-02 NOTE — PATIENT INSTRUCTIONS
[address] [place] [address] [phone]  [address] [facility], [facility] [address]   [address] Yvonne Fink Financial Navigator  Phone [phone]  Fax [phone]  [facility] Harper Billing [address] [facility] Scheduling   [phone]

## 2023-02-06 ENCOUNTER — NURSE ONLY (OUTPATIENT)
Dept: INFUSION THERAPY | Age: 53
End: 2023-02-06
Payer: COMMERCIAL

## 2023-02-06 VITALS
TEMPERATURE: 98 F | WEIGHT: 154.2 LBS | RESPIRATION RATE: 16 BRPM | DIASTOLIC BLOOD PRESSURE: 72 MMHG | HEART RATE: 76 BPM | BODY MASS INDEX: 28.2 KG/M2 | SYSTOLIC BLOOD PRESSURE: 134 MMHG

## 2023-02-06 DIAGNOSIS — Z79.899 HIGH RISK MEDICATION USE: ICD-10-CM

## 2023-02-06 DIAGNOSIS — M05.79 RHEUMATOID ARTHRITIS INVOLVING MULTIPLE SITES WITH POSITIVE RHEUMATOID FACTOR (HCC): Primary | ICD-10-CM

## 2023-02-06 LAB
ALT SERPL-CCNC: 15 U/L (ref 10–40)
AST SERPL-CCNC: 18 U/L (ref 15–37)
BASOPHILS ABSOLUTE: 0 K/UL (ref 0–0.2)
BASOPHILS RELATIVE PERCENT: 0.4 %
C-REACTIVE PROTEIN: 4.1 MG/L (ref 0–5.1)
CREAT SERPL-MCNC: 0.9 MG/DL (ref 0.6–1.1)
EOSINOPHILS ABSOLUTE: 0.1 K/UL (ref 0–0.6)
EOSINOPHILS RELATIVE PERCENT: 1.9 %
GFR SERPL CREATININE-BSD FRML MDRD: >60 ML/MIN/{1.73_M2}
HCT VFR BLD CALC: 39.6 % (ref 36–48)
HEMOGLOBIN: 12.8 G/DL (ref 12–16)
HEPATITIS B SURFACE ANTIGEN INTERPRETATION: NORMAL
HEPATITIS C ANTIBODY INTERPRETATION: NORMAL
LYMPHOCYTES ABSOLUTE: 1.5 K/UL (ref 1–5.1)
LYMPHOCYTES RELATIVE PERCENT: 41.6 %
MCH RBC QN AUTO: 33.6 PG (ref 26–34)
MCHC RBC AUTO-ENTMCNC: 32.2 G/DL (ref 31–36)
MCV RBC AUTO: 104.2 FL (ref 80–100)
MONOCYTES ABSOLUTE: 0.3 K/UL (ref 0–1.3)
MONOCYTES RELATIVE PERCENT: 9.3 %
NEUTROPHILS ABSOLUTE: 1.7 K/UL (ref 1.7–7.7)
NEUTROPHILS RELATIVE PERCENT: 46.8 %
PDW BLD-RTO: 14.2 % (ref 12.4–15.4)
PLATELET # BLD: 276 K/UL (ref 135–450)
PMV BLD AUTO: 9.2 FL (ref 5–10.5)
RBC # BLD: 3.8 M/UL (ref 4–5.2)
SEDIMENTATION RATE, ERYTHROCYTE: 15 MM/HR (ref 0–30)
WBC # BLD: 3.7 K/UL (ref 4–11)

## 2023-02-06 PROCEDURE — 96413 CHEMO IV INFUSION 1 HR: CPT | Performed by: INTERNAL MEDICINE

## 2023-02-06 PROCEDURE — 36415 COLL VENOUS BLD VENIPUNCTURE: CPT | Performed by: INTERNAL MEDICINE

## 2023-02-09 DIAGNOSIS — M05.79 RHEUMATOID ARTHRITIS INVOLVING MULTIPLE SITES WITH POSITIVE RHEUMATOID FACTOR (HCC): Primary | ICD-10-CM

## 2023-02-09 RX ORDER — EPINEPHRINE 1 MG/ML
0.3 INJECTION, SOLUTION, CONCENTRATE INTRAVENOUS PRN
OUTPATIENT
Start: 2023-03-06

## 2023-02-09 RX ORDER — SODIUM CHLORIDE 9 MG/ML
INJECTION, SOLUTION INTRAVENOUS CONTINUOUS
OUTPATIENT
Start: 2023-03-06

## 2023-02-09 RX ORDER — HEPARIN SODIUM (PORCINE) LOCK FLUSH IV SOLN 100 UNIT/ML 100 UNIT/ML
500 SOLUTION INTRAVENOUS PRN
OUTPATIENT
Start: 2023-03-06

## 2023-02-09 RX ORDER — DIPHENHYDRAMINE HYDROCHLORIDE 50 MG/ML
50 INJECTION INTRAMUSCULAR; INTRAVENOUS
OUTPATIENT
Start: 2023-03-06

## 2023-02-09 RX ORDER — SODIUM CHLORIDE 9 MG/ML
5-250 INJECTION, SOLUTION INTRAVENOUS PRN
OUTPATIENT
Start: 2023-03-06

## 2023-02-09 RX ORDER — ACETAMINOPHEN 325 MG/1
650 TABLET ORAL
OUTPATIENT
Start: 2023-03-06

## 2023-02-09 RX ORDER — ALBUTEROL SULFATE 90 UG/1
4 AEROSOL, METERED RESPIRATORY (INHALATION) PRN
OUTPATIENT
Start: 2023-03-06

## 2023-02-09 RX ORDER — ONDANSETRON 2 MG/ML
8 INJECTION INTRAMUSCULAR; INTRAVENOUS
OUTPATIENT
Start: 2023-03-06

## 2023-02-09 RX ORDER — FAMOTIDINE 10 MG/ML
20 INJECTION, SOLUTION INTRAVENOUS
OUTPATIENT
Start: 2023-03-06

## 2023-02-09 RX ORDER — SODIUM CHLORIDE 0.9 % (FLUSH) 0.9 %
5-40 SYRINGE (ML) INJECTION PRN
OUTPATIENT
Start: 2023-03-06

## 2023-02-15 ENCOUNTER — TELEPHONE (OUTPATIENT)
Dept: INFUSION THERAPY | Age: 53
End: 2023-02-15

## 2023-02-15 NOTE — TELEPHONE ENCOUNTER
Insurance company called to inform medical assistant, they are \"not able to reach the   prior authorization team \" so they called the referring office. [facility] is approved, the site of care is NOT approved. Patient can Not infuse at the hospital.  Pt will be sent to [address] [place] [address].

## 2023-02-20 RX ORDER — METHOTREXATE 2.5 MG/1
TABLET ORAL
Qty: 104 TABLET | Refills: 0 | Status: SHIPPED | OUTPATIENT
Start: 2023-02-20

## 2023-07-20 RX ORDER — FOLIC ACID 1 MG/1
TABLET ORAL
Qty: 90 TABLET | Refills: 3 | OUTPATIENT
Start: 2023-07-20

## 2023-07-20 NOTE — TELEPHONE ENCOUNTER
Honor July is available at her new location to address any patient needs. Directed pharmacy to reach out to the new location for regarding this refill.
